# Patient Record
Sex: FEMALE | Race: WHITE | NOT HISPANIC OR LATINO | URBAN - METROPOLITAN AREA
[De-identification: names, ages, dates, MRNs, and addresses within clinical notes are randomized per-mention and may not be internally consistent; named-entity substitution may affect disease eponyms.]

---

## 2021-08-16 ENCOUNTER — TRANSCRIBE ORDERS (OUTPATIENT)
Dept: ADMINISTRATIVE | Facility: HOSPITAL | Age: 31
End: 2021-08-16

## 2021-08-16 DIAGNOSIS — R00.2 PALPITATIONS: Primary | ICD-10-CM

## 2021-08-19 ENCOUNTER — HOSPITAL ENCOUNTER (OUTPATIENT)
Dept: RESPIRATORY THERAPY | Facility: HOSPITAL | Age: 31
Discharge: HOME | End: 2021-08-19

## 2021-08-19 ENCOUNTER — APPOINTMENT (OUTPATIENT)
Dept: RESPIRATORY THERAPY | Facility: HOSPITAL | Age: 31
End: 2021-08-19

## 2021-08-19 DIAGNOSIS — R00.2 PALPITATIONS: ICD-10-CM

## 2021-09-19 LAB
Lab: 23
TOAL ENROLLMENT DAYS: 30

## 2021-09-19 PROCEDURE — 93228 REMOTE 30 DAY ECG REV/REPORT: CPT | Performed by: INTERNAL MEDICINE

## 2024-12-19 ENCOUNTER — HOSPITAL ENCOUNTER (EMERGENCY)
Facility: HOSPITAL | Age: 34
Discharge: HOME | End: 2024-12-19
Attending: EMERGENCY MEDICINE
Payer: MEDICAID

## 2024-12-19 ENCOUNTER — APPOINTMENT (OUTPATIENT)
Dept: GENERAL RADIOLOGY | Facility: HOSPITAL | Age: 34
End: 2024-12-19
Payer: MEDICAID

## 2024-12-19 VITALS
BODY MASS INDEX: 42.28 KG/M2 | TEMPERATURE: 98.7 F | DIASTOLIC BLOOD PRESSURE: 62 MMHG | SYSTOLIC BLOOD PRESSURE: 124 MMHG | HEIGHT: 68 IN | WEIGHT: 279 LBS | HEART RATE: 75 BPM | OXYGEN SATURATION: 100 % | RESPIRATION RATE: 16 BRPM

## 2024-12-19 DIAGNOSIS — R42 DIZZINESS: ICD-10-CM

## 2024-12-19 DIAGNOSIS — R07.9 CHEST PAIN, UNSPECIFIED TYPE: Primary | ICD-10-CM

## 2024-12-19 LAB
ALBUMIN SERPL-MCNC: 4 G/DL (ref 3.5–5.2)
ALBUMIN/GLOB SERPL: 1 G/DL
ALP SERPL-CCNC: 66 U/L (ref 39–117)
ALT SERPL W P-5'-P-CCNC: 20 U/L (ref 1–33)
ANION GAP SERPL CALCULATED.3IONS-SCNC: 10.3 MMOL/L (ref 5–15)
AST SERPL-CCNC: 22 U/L (ref 1–32)
BASOPHILS # BLD AUTO: 0.05 10*3/MM3 (ref 0–0.2)
BASOPHILS NFR BLD AUTO: 0.6 % (ref 0–1.5)
BILIRUB SERPL-MCNC: 0.8 MG/DL (ref 0–1.2)
BUN SERPL-MCNC: 8 MG/DL (ref 6–20)
BUN/CREAT SERPL: 9 (ref 7–25)
CALCIUM SPEC-SCNC: 9.9 MG/DL (ref 8.6–10.5)
CHLORIDE SERPL-SCNC: 102 MMOL/L (ref 98–107)
CO2 SERPL-SCNC: 26.7 MMOL/L (ref 22–29)
CREAT SERPL-MCNC: 0.89 MG/DL (ref 0.57–1)
DEPRECATED RDW RBC AUTO: 39.8 FL (ref 37–54)
EGFRCR SERPLBLD CKD-EPI 2021: 86.8 ML/MIN/1.73
EOSINOPHIL # BLD AUTO: 0.05 10*3/MM3 (ref 0–0.4)
EOSINOPHIL NFR BLD AUTO: 0.6 % (ref 0.3–6.2)
ERYTHROCYTE [DISTWIDTH] IN BLOOD BY AUTOMATED COUNT: 12.1 % (ref 12.3–15.4)
GLOBULIN UR ELPH-MCNC: 4 GM/DL
GLUCOSE SERPL-MCNC: 90 MG/DL (ref 65–99)
HCT VFR BLD AUTO: 37.1 % (ref 34–46.6)
HGB BLD-MCNC: 12.1 G/DL (ref 12–15.9)
IMM GRANULOCYTES # BLD AUTO: 0.02 10*3/MM3 (ref 0–0.05)
IMM GRANULOCYTES NFR BLD AUTO: 0.2 % (ref 0–0.5)
LYMPHOCYTES # BLD AUTO: 3.21 10*3/MM3 (ref 0.7–3.1)
LYMPHOCYTES NFR BLD AUTO: 38.2 % (ref 19.6–45.3)
MCH RBC QN AUTO: 29.2 PG (ref 26.6–33)
MCHC RBC AUTO-ENTMCNC: 32.6 G/DL (ref 31.5–35.7)
MCV RBC AUTO: 89.6 FL (ref 79–97)
MONOCYTES # BLD AUTO: 0.58 10*3/MM3 (ref 0.1–0.9)
MONOCYTES NFR BLD AUTO: 6.9 % (ref 5–12)
NEUTROPHILS NFR BLD AUTO: 4.49 10*3/MM3 (ref 1.7–7)
NEUTROPHILS NFR BLD AUTO: 53.5 % (ref 42.7–76)
NRBC BLD AUTO-RTO: 0 /100 WBC (ref 0–0.2)
PLATELET # BLD AUTO: 382 10*3/MM3 (ref 140–450)
PMV BLD AUTO: 9.3 FL (ref 6–12)
POTASSIUM SERPL-SCNC: 3.8 MMOL/L (ref 3.5–5.2)
PROT SERPL-MCNC: 8 G/DL (ref 6–8.5)
RBC # BLD AUTO: 4.14 10*6/MM3 (ref 3.77–5.28)
SODIUM SERPL-SCNC: 139 MMOL/L (ref 136–145)
TROPONIN T SERPL HS-MCNC: <6 NG/L
WBC NRBC COR # BLD AUTO: 8.4 10*3/MM3 (ref 3.4–10.8)

## 2024-12-19 PROCEDURE — 85025 COMPLETE CBC W/AUTO DIFF WBC: CPT

## 2024-12-19 PROCEDURE — 71046 X-RAY EXAM CHEST 2 VIEWS: CPT

## 2024-12-19 PROCEDURE — 93005 ELECTROCARDIOGRAM TRACING: CPT | Performed by: EMERGENCY MEDICINE

## 2024-12-19 PROCEDURE — 99284 EMERGENCY DEPT VISIT MOD MDM: CPT

## 2024-12-19 PROCEDURE — 84484 ASSAY OF TROPONIN QUANT: CPT

## 2024-12-19 PROCEDURE — 80053 COMPREHEN METABOLIC PANEL: CPT

## 2024-12-19 PROCEDURE — 93005 ELECTROCARDIOGRAM TRACING: CPT

## 2024-12-19 RX ORDER — ALPRAZOLAM 0.5 MG
0.25 TABLET ORAL ONCE
Status: COMPLETED | OUTPATIENT
Start: 2024-12-19 | End: 2024-12-19

## 2024-12-19 RX ORDER — PANTOPRAZOLE SODIUM 40 MG/1
40 TABLET, DELAYED RELEASE ORAL DAILY
Qty: 14 TABLET | Refills: 0 | Status: SHIPPED | OUTPATIENT
Start: 2024-12-19

## 2024-12-19 RX ADMIN — ALPRAZOLAM 0.25 MG: 0.5 TABLET ORAL at 18:53

## 2024-12-19 NOTE — ED PROVIDER NOTES
Subjective   Provider in Triage Note  Patient is a 34-year-old female presenting to the ED for chest pain intermittently the past 3 days.  Patient states her chest pain came on while she was making dinner 3 days ago and has been intermittent ever since.  She reports coming off Trintellix right before this started.  She reports increased belching and states she feels like she cannot get a full deep breath.  She denies any recent travel, OCP use, pain in her calfs.    Due to significant overcrowding in the emergency department patient was initially seen and evaluated in triage.  Provider in triage recommended patient placement in the treatment area to initiate therapy and movement to an ER bed as soon as possible.        History of Present Illness  I interviewed the patient HPI and agree with the nurse practitioner providing triage note as noted above  Review of Systems    No past medical history on file.    Allergies   Allergen Reactions    Amoxicillin GI Bleeding    Penicillins Rash and GI Bleeding       No past surgical history on file.    No family history on file.    Social History     Socioeconomic History    Marital status:            Objective   Physical Exam  Neck has no adenopathy JVD or bruits.  Lungs are clear.  Heart has regular rhythm without murmur rub or.  Chest is nontender.  Ab soft.  Extremities I am unremarkable.  Procedures       EKG interpretation is normal sinus rhythm at a rate of 70 with no acute ST change    ED Course      Results for orders placed or performed during the hospital encounter of 12/19/24   ECG 12 Lead Chest Pain    Collection Time: 12/19/24  3:36 PM   Result Value Ref Range    QT Interval 393 ms    QTC Interval 424 ms   Comprehensive Metabolic Panel    Collection Time: 12/19/24  4:43 PM    Specimen: Blood   Result Value Ref Range    Glucose 90 65 - 99 mg/dL    BUN 8 6 - 20 mg/dL    Creatinine 0.89 0.57 - 1.00 mg/dL    Sodium 139 136 - 145 mmol/L    Potassium 3.8 3.5 -  5.2 mmol/L    Chloride 102 98 - 107 mmol/L    CO2 26.7 22.0 - 29.0 mmol/L    Calcium 9.9 8.6 - 10.5 mg/dL    Total Protein 8.0 6.0 - 8.5 g/dL    Albumin 4.0 3.5 - 5.2 g/dL    ALT (SGPT) 20 1 - 33 U/L    AST (SGOT) 22 1 - 32 U/L    Alkaline Phosphatase 66 39 - 117 U/L    Total Bilirubin 0.8 0.0 - 1.2 mg/dL    Globulin 4.0 gm/dL    A/G Ratio 1.0 g/dL    BUN/Creatinine Ratio 9.0 7.0 - 25.0    Anion Gap 10.3 5.0 - 15.0 mmol/L    eGFR 86.8 >60.0 mL/min/1.73   High Sensitivity Troponin T    Collection Time: 12/19/24  4:43 PM    Specimen: Blood   Result Value Ref Range    HS Troponin T <6 <14 ng/L   CBC Auto Differential    Collection Time: 12/19/24  4:43 PM    Specimen: Blood   Result Value Ref Range    WBC 8.40 3.40 - 10.80 10*3/mm3    RBC 4.14 3.77 - 5.28 10*6/mm3    Hemoglobin 12.1 12.0 - 15.9 g/dL    Hematocrit 37.1 34.0 - 46.6 %    MCV 89.6 79.0 - 97.0 fL    MCH 29.2 26.6 - 33.0 pg    MCHC 32.6 31.5 - 35.7 g/dL    RDW 12.1 (L) 12.3 - 15.4 %    RDW-SD 39.8 37.0 - 54.0 fl    MPV 9.3 6.0 - 12.0 fL    Platelets 382 140 - 450 10*3/mm3    Neutrophil % 53.5 42.7 - 76.0 %    Lymphocyte % 38.2 19.6 - 45.3 %    Monocyte % 6.9 5.0 - 12.0 %    Eosinophil % 0.6 0.3 - 6.2 %    Basophil % 0.6 0.0 - 1.5 %    Immature Grans % 0.2 0.0 - 0.5 %    Neutrophils, Absolute 4.49 1.70 - 7.00 10*3/mm3    Lymphocytes, Absolute 3.21 (H) 0.70 - 3.10 10*3/mm3    Monocytes, Absolute 0.58 0.10 - 0.90 10*3/mm3    Eosinophils, Absolute 0.05 0.00 - 0.40 10*3/mm3    Basophils, Absolute 0.05 0.00 - 0.20 10*3/mm3    Immature Grans, Absolute 0.02 0.00 - 0.05 10*3/mm3    nRBC 0.0 0.0 - 0.2 /100 WBC     XR Chest 2 View    Result Date: 12/19/2024  Impression: 1.No acute radiographic abnormality is identified. Electronically Signed: Jake Daly MD  12/19/2024 4:12 PM EST  Workstation ID: [id number]               HEART Score: 0   Shared Decision Making  I discussed the findings with the patient/patient representative who is in agreement with the treatment  plan and the final disposition.  Risks and benefits of discharge and/or observation/admission were discussed: Yes                                      Medical Decision Making  Chest x-ray interpretation shows no cardiomegaly fusion or infiltrate.  CBC shows no leukocytosis no left shift and no anemia.  Patient has no evidence of current acute coronary syndrome based on EKG and troponin.  Metabolic panel shows no renal insufficiency or electrolyte abnormality.  FTs normal with no evidence of hepatitis.  Patient is pain-free on reexam.  She will be discharged.  She will follow with MD for recheck.  Will place on Protonix for frequent belching as well.    Amount and/or Complexity of Data Reviewed  Labs: ordered. Decision-making details documented in ED Course.  Radiology: ordered and independent interpretation performed.  ECG/medicine tests: ordered and independent interpretation performed.    Risk  Prescription drug management.        Final diagnoses:   Chest pain, unspecified type   Dizziness       ED Disposition  ED Disposition       ED Disposition   Discharge    Condition   Stable    Comment   --               No follow-up provider specified.       Medication List        New Prescriptions      pantoprazole 40 MG EC tablet  Commonly known as: PROTONIX  Take 1 tablet by mouth Daily.               Where to Get Your Medications        Information about where to get these medications is not yet available    Ask your nurse or doctor about these medications  pantoprazole 40 MG EC tablet            Jeison Ramsay MD  12/19/24 7638

## 2024-12-20 LAB
QT INTERVAL: 393 MS
QTC INTERVAL: 424 MS

## 2024-12-20 NOTE — ED NOTES
"Attempted to d/c pt and she stated that she felt like she was going to \"pass out.\" Pt's palms felt warm and clammy. Pt stated that she was recently prescribed anxiety medication through PCP, had night time terror reactions and was taken off. Pt was then placed on a 15 day trial rx of xanax. Pt called to request refill 2 days ago, but pharmacy has yet to fill. Provider notified.  "

## 2025-01-08 ENCOUNTER — TRANSCRIBE ORDERS (OUTPATIENT)
Dept: ADMINISTRATIVE | Facility: HOSPITAL | Age: 35
End: 2025-01-08
Payer: MEDICAID

## 2025-01-08 DIAGNOSIS — R10.11 RUQ PAIN: Primary | ICD-10-CM

## 2025-01-08 DIAGNOSIS — R10.13 EPIGASTRIC PAIN: ICD-10-CM

## 2025-01-08 DIAGNOSIS — R14.0 POSTPRANDIAL BLOATING: ICD-10-CM

## 2025-01-21 ENCOUNTER — HOSPITAL ENCOUNTER (OUTPATIENT)
Dept: ULTRASOUND IMAGING | Facility: HOSPITAL | Age: 35
Discharge: HOME | End: 2025-01-21
Payer: COMMERCIAL

## 2025-01-21 ENCOUNTER — HOSPITAL ENCOUNTER (OUTPATIENT)
Dept: GENERAL RADIOLOGY | Facility: HOSPITAL | Age: 35
Discharge: HOME | End: 2025-01-21
Payer: COMMERCIAL

## 2025-01-21 DIAGNOSIS — R14.0 POSTPRANDIAL BLOATING: ICD-10-CM

## 2025-01-21 DIAGNOSIS — R10.13 EPIGASTRIC PAIN: ICD-10-CM

## 2025-01-21 DIAGNOSIS — R10.11 RUQ PAIN: ICD-10-CM

## 2025-01-21 PROCEDURE — 74230 X-RAY XM SWLNG FUNCJ C+: CPT

## 2025-01-21 PROCEDURE — 92611 MOTION FLUOROSCOPY/SWALLOW: CPT

## 2025-01-21 PROCEDURE — 76705 ECHO EXAM OF ABDOMEN: CPT

## 2025-01-21 RX ADMIN — BARIUM SULFATE 50 ML: 400 SUSPENSION ORAL at 08:51

## 2025-01-21 NOTE — MBS/VFSS/FEES
Acute Care - Speech Language Pathology   Swallow Initial Evaluation  Luis     Patient Name: Digna West  : 1990  MRN: [id number]  Today's Date: 2025               Admit Date: 2025    Visit Dx:     ICD-10-CM ICD-9-CM   1. RUQ pain  R10.11 789.01   2. Epigastric pain  R10.13 789.06   3. Postprandial bloating  R14.0 787.3     There is no problem list on file for this patient.    No past medical history on file.  No past surgical history on file.    SLP Recommendation and Plan  SLP Swallowing Diagnosis: functional oral phase, functional pharyngeal phase (25)  SLP Diet Recommendation: regular textures, thin liquids (25)     SLP Rec. for Method of Medication Administration: as tolerated (25)           Swallow Criteria for Skilled Therapeutic Interventions Met: no problems identified which require skilled intervention (25)  Anticipated Discharge Disposition (SLP): No further SLP services warranted (25)     Therapy Frequency (Swallow): evaluation only (25)     Oral Care Recommendations: Oral Care BID/PRN, Toothbrush (25)    Subjective: Pt consulted for VFSS due to reports of new difficulty swallowing and belching. Pt reports she was placed on soft diet by doctor, but baseline is reg/thin with no swallowing difficulty.   Previous Diet: Reg/thin, eating Soft/thins.   Oral Motor Examination: Unremarkable. Natural, adequate bottom teeth - missing and poor upper teeth.   Patient Positioning: Standing  Viewing Plane: Lateral   Contrast: Barium used across trial textures; administered via spoon, cup, and straw as tolerated. The patient was assessed with the following consistencies: Thins by cup x2, thins by straw x1, puree (applesauce) x2, Peaches, Regular (cracker with paste) x1. Pt able to hold cup and take drinks independently.     Overall Impressions: VFSS completed on this date to address pt reported concerns. Pt  oral-pharyngeal swallow found WNL. It is recommended pt continue with Regular/Thin diet. Pt may benefit from GI follow up to address belching. Results, recommendations, and rationale given to pt - verbalized understanding.     Oral Phase found WNL - lip closure, tongue control, mastication, bolus transport, and oral clearance were all WNL.     Pharyngeal phase found WNL - initiation of swallow, soft palate and laryngeal elevation, hyoid and epiglottic movement, laryngeal vestibular closure, pharyngeal stripping wave, pharyngeal contraction, PES clearance, pharyngeal clearance, and tongue base retraction all WNL. Esophageal scan unremarkable. Pt scored a 1 on the Penetration Aspiration Scale (PAS), details below.     Penetration Aspiration Scale (PAS) is an eight-point scale used to assess the severity of penetration or aspiration observed during a VFSS/MBSS. The scores are as follows:   No Penetration or Aspiration: Material does not enter the airway.  Penetration, Contrast Remains Above Vocal Folds, Subsequently Ejected: Material enters the larynx but stays above the vocal folds and is later ejected.   Penetration, Contrast Remains Above Vocal folds, Not Ejected: Material enters the larynx but remains above the vocal folds without being ejected.   Penetration, Contrast Contacts Vocal Folds, Subsequently Ejected: Material contacts the vocal folds and is later ejected.  Penetration, Contrast Contacts Vocal Folds, Not Ejected: Material contacts the vocal folds but is not ejected.  Aspiration (Contrast Below Vocal Folds), Subsequently Ejected (At Least into Larynx): Material passes below the vocal folds and is later ejected.  Aspiration (Contrast Below Vocal Folds), Not Ejected Despite Effort: Material passes below the vocal folds but is not ejected despite effort.  Aspiration (Contrast Below Vocal Folds), No Effort Made to Eject: Material passes below the vocal folds, and no effort is made to eject it.       SLP  Recommendation and Plan  -Regular/thin  -Possible GI follow up to address belching.   SWALLOW EVALUATION (Last 72 Hours)       SLP Adult Swallow Evaluation       Row Name 01/21/25 0900       Rehab Evaluation    Document Type discharge evaluation/summary  -AA    Subjective Information no complaints  -AA    Patient Observations alert;cooperative;agree to therapy  -AA    Patient Effort excellent  -AA    Symptoms Noted During/After Treatment none  -AA       General Information    Patient Profile Reviewed yes  -AA    Pertinent History Of Current Problem Pt consulted d/t reports of chest pain that developed into swallowing difficulty. Pt reports being placed on soft diet per doctor. Pt also reports frequent belching. No hx of swallowing difficulties before this.  -AA    Current Method of Nutrition soft to chew textures;mechanical ground textures;thin liquids  -AA    Precautions/Limitations, Vision WFL;for purposes of eval  -AA    Precautions/Limitations, Hearing WFL;for purposes of eval  -AA    Prior Level of Function-Communication WFL  -AA    Prior Level of Function-Swallowing no diet consistency restrictions;safe, efficient swallowing in all situations;regular textures;thin liquids  -AA    Plans/Goals Discussed with patient;agreed upon  -AA    Barriers to Rehab none identified  -AA       Oral Motor Structure and Function    Dentition Assessment missing teeth;teeth are in poor condition  -AA    Secretion Management WNL/WFL  -AA    Mucosal Quality moist, healthy  -AA    Volitional Swallow WFL  -AA    Volitional Cough WFL  -AA       Oral Musculature and Cranial Nerve Assessment    Oral Motor General Assessment WFL  -AA       General Eating/Swallowing Observations    Respiratory Support Currently in Use room air  -AA       MBS/VFSS    Utensils Used spoon;cup;straw  -AA    Consistencies Trialed thin liquids;pureed;mixed consistency;mechanical ground textures;regular textures  -AA       MBS/VFSS Interpretation    Oral Prep  Phase WFL  -AA    Oral Transit Phase WFL  -AA    Oral Residue WFL  -AA       Initiation of Pharyngeal Swallow    Initiation of Pharyngeal Swallow WFL  -AA    Pharyngeal Phase functional pharyngeal phase of swallowing  -AA       SLP Evaluation Clinical Impression    SLP Swallowing Diagnosis functional oral phase;functional pharyngeal phase  -AA    Functional Impact no impact on function  -AA    Swallow Criteria for Skilled Therapeutic Interventions Met no problems identified which require skilled intervention  -AA       Recommendations    Therapy Frequency (Swallow) evaluation only  -AA    SLP Diet Recommendation regular textures;thin liquids  -AA    Oral Care Recommendations Oral Care BID/PRN;Toothbrush  -AA    SLP Rec. for Method of Medication Administration as tolerated  -AA    Anticipated Discharge Disposition (SLP) No further SLP services warranted  -AA              User Key  (r) = Recorded By, (t) = Taken By, (c) = Cosigned By      Initials Name Effective Dates    AA Lolita Isaac SLP 06/18/24 -                     EDUCATION  The patient has been educated in the following areas:   Dysphagia (Swallowing Impairment).         LIZZ Diaz  1/21/2025

## 2025-02-07 ENCOUNTER — APPOINTMENT (OUTPATIENT)
Dept: GENERAL RADIOLOGY | Facility: HOSPITAL | Age: 35
End: 2025-02-07
Payer: COMMERCIAL

## 2025-02-07 ENCOUNTER — HOSPITAL ENCOUNTER (OUTPATIENT)
Facility: HOSPITAL | Age: 35
Setting detail: OBSERVATION
Discharge: HOME | End: 2025-02-09
Attending: FAMILY MEDICINE | Admitting: FAMILY MEDICINE
Payer: COMMERCIAL

## 2025-02-07 ENCOUNTER — APPOINTMENT (OUTPATIENT)
Dept: CARDIOLOGY | Facility: HOSPITAL | Age: 35
End: 2025-02-07
Payer: COMMERCIAL

## 2025-02-07 PROBLEM — R07.9 CHEST PAIN: Status: ACTIVE | Noted: 2025-02-07

## 2025-02-07 LAB
ALBUMIN SERPL-MCNC: 4.4 G/DL (ref 3.5–5.2)
ALBUMIN SERPL-MCNC: 5 G/DL (ref 3.5–5.2)
ALBUMIN/GLOB SERPL: 1.3 G/DL
ALBUMIN/GLOB SERPL: 1.4 G/DL
ALP SERPL-CCNC: 71 U/L (ref 39–117)
ALP SERPL-CCNC: 77 U/L (ref 39–117)
ALT SERPL W P-5'-P-CCNC: 21 U/L (ref 1–33)
ALT SERPL W P-5'-P-CCNC: 24 U/L (ref 1–33)
AMYLASE SERPL-CCNC: 38 U/L (ref 28–100)
ANION GAP SERPL CALCULATED.3IONS-SCNC: 11.2 MMOL/L (ref 5–15)
ANION GAP SERPL CALCULATED.3IONS-SCNC: 9.3 MMOL/L (ref 5–15)
AST SERPL-CCNC: 21 U/L (ref 1–32)
AST SERPL-CCNC: 28 U/L (ref 1–32)
B PARAPERT DNA SPEC QL NAA+PROBE: NOT DETECTED
B PERT DNA SPEC QL NAA+PROBE: NOT DETECTED
BACTERIA UR QL AUTO: NORMAL /HPF
BASOPHILS # BLD AUTO: 0.06 10*3/MM3 (ref 0–0.2)
BASOPHILS # BLD AUTO: 0.06 10*3/MM3 (ref 0–0.2)
BASOPHILS NFR BLD AUTO: 0.6 % (ref 0–1.5)
BASOPHILS NFR BLD AUTO: 0.7 % (ref 0–1.5)
BILIRUB SERPL-MCNC: 0.7 MG/DL (ref 0–1.2)
BILIRUB SERPL-MCNC: 0.7 MG/DL (ref 0–1.2)
BILIRUB UR QL STRIP: NEGATIVE
BUN SERPL-MCNC: 10 MG/DL (ref 6–20)
BUN SERPL-MCNC: 8 MG/DL (ref 6–20)
BUN/CREAT SERPL: 10 (ref 7–25)
BUN/CREAT SERPL: 8.8 (ref 7–25)
C PNEUM DNA NPH QL NAA+NON-PROBE: NOT DETECTED
CALCIUM SPEC-SCNC: 9.8 MG/DL (ref 8.6–10.5)
CALCIUM SPEC-SCNC: 9.9 MG/DL (ref 8.6–10.5)
CHLORIDE SERPL-SCNC: 105 MMOL/L (ref 98–107)
CHLORIDE SERPL-SCNC: 105 MMOL/L (ref 98–107)
CK MB SERPL-CCNC: <1 NG/ML
CK SERPL-CCNC: 80 U/L (ref 20–180)
CLARITY UR: CLEAR
CO2 SERPL-SCNC: 25.8 MMOL/L (ref 22–29)
CO2 SERPL-SCNC: 26.7 MMOL/L (ref 22–29)
COLOR UR: YELLOW
CREAT SERPL-MCNC: 0.91 MG/DL (ref 0.57–1)
CREAT SERPL-MCNC: 1 MG/DL (ref 0.57–1)
CRP SERPL-MCNC: 0.94 MG/DL (ref 0–0.5)
DEPRECATED RDW RBC AUTO: 39.8 FL (ref 37–54)
DEPRECATED RDW RBC AUTO: 39.9 FL (ref 37–54)
EGFRCR SERPLBLD CKD-EPI 2021: 75.5 ML/MIN/1.73
EGFRCR SERPLBLD CKD-EPI 2021: 84.5 ML/MIN/1.73
EOSINOPHIL # BLD AUTO: 0.05 10*3/MM3 (ref 0–0.4)
EOSINOPHIL # BLD AUTO: 0.13 10*3/MM3 (ref 0–0.4)
EOSINOPHIL NFR BLD AUTO: 0.6 % (ref 0.3–6.2)
EOSINOPHIL NFR BLD AUTO: 1.4 % (ref 0.3–6.2)
ERYTHROCYTE [DISTWIDTH] IN BLOOD BY AUTOMATED COUNT: 12 % (ref 12.3–15.4)
ERYTHROCYTE [DISTWIDTH] IN BLOOD BY AUTOMATED COUNT: 12.2 % (ref 12.3–15.4)
FLUAV SUBTYP SPEC NAA+PROBE: NOT DETECTED
FLUBV RNA ISLT QL NAA+PROBE: NOT DETECTED
GEN 5 1HR TROPONIN T REFLEX: <6 NG/L
GLOBULIN UR ELPH-MCNC: 3.3 GM/DL
GLOBULIN UR ELPH-MCNC: 3.5 GM/DL
GLUCOSE SERPL-MCNC: 78 MG/DL (ref 65–99)
GLUCOSE SERPL-MCNC: 90 MG/DL (ref 65–99)
GLUCOSE UR STRIP-MCNC: NEGATIVE MG/DL
HADV DNA SPEC NAA+PROBE: NOT DETECTED
HCOV 229E RNA SPEC QL NAA+PROBE: NOT DETECTED
HCOV HKU1 RNA SPEC QL NAA+PROBE: NOT DETECTED
HCOV NL63 RNA SPEC QL NAA+PROBE: NOT DETECTED
HCOV OC43 RNA SPEC QL NAA+PROBE: NOT DETECTED
HCT VFR BLD AUTO: 38.6 % (ref 34–46.6)
HCT VFR BLD AUTO: 40 % (ref 34–46.6)
HGB BLD-MCNC: 12.3 G/DL (ref 12–15.9)
HGB BLD-MCNC: 12.9 G/DL (ref 12–15.9)
HGB UR QL STRIP.AUTO: ABNORMAL
HMPV RNA NPH QL NAA+NON-PROBE: NOT DETECTED
HPIV1 RNA ISLT QL NAA+PROBE: NOT DETECTED
HPIV2 RNA SPEC QL NAA+PROBE: NOT DETECTED
HPIV3 RNA NPH QL NAA+PROBE: NOT DETECTED
HPIV4 P GENE NPH QL NAA+PROBE: NOT DETECTED
HYALINE CASTS UR QL AUTO: NORMAL /LPF
IMM GRANULOCYTES # BLD AUTO: 0.02 10*3/MM3 (ref 0–0.05)
IMM GRANULOCYTES # BLD AUTO: 0.03 10*3/MM3 (ref 0–0.05)
IMM GRANULOCYTES NFR BLD AUTO: 0.2 % (ref 0–0.5)
IMM GRANULOCYTES NFR BLD AUTO: 0.4 % (ref 0–0.5)
KETONES UR QL STRIP: NEGATIVE
LEUKOCYTE ESTERASE UR QL STRIP.AUTO: NEGATIVE
LIPASE SERPL-CCNC: 11 U/L (ref 13–60)
LYMPHOCYTES # BLD AUTO: 2.63 10*3/MM3 (ref 0.7–3.1)
LYMPHOCYTES # BLD AUTO: 3.62 10*3/MM3 (ref 0.7–3.1)
LYMPHOCYTES NFR BLD AUTO: 32.8 % (ref 19.6–45.3)
LYMPHOCYTES NFR BLD AUTO: 39 % (ref 19.6–45.3)
M PNEUMO IGG SER IA-ACNC: NOT DETECTED
MCH RBC QN AUTO: 28.7 PG (ref 26.6–33)
MCH RBC QN AUTO: 28.9 PG (ref 26.6–33)
MCHC RBC AUTO-ENTMCNC: 31.9 G/DL (ref 31.5–35.7)
MCHC RBC AUTO-ENTMCNC: 32.3 G/DL (ref 31.5–35.7)
MCV RBC AUTO: 89.5 FL (ref 79–97)
MCV RBC AUTO: 90.2 FL (ref 79–97)
MONOCYTES # BLD AUTO: 0.54 10*3/MM3 (ref 0.1–0.9)
MONOCYTES # BLD AUTO: 0.78 10*3/MM3 (ref 0.1–0.9)
MONOCYTES NFR BLD AUTO: 6.7 % (ref 5–12)
MONOCYTES NFR BLD AUTO: 8.4 % (ref 5–12)
MYOGLOBIN SERPL-MCNC: 38.2 NG/ML (ref 25–58)
NEUTROPHILS NFR BLD AUTO: 4.67 10*3/MM3 (ref 1.7–7)
NEUTROPHILS NFR BLD AUTO: 4.71 10*3/MM3 (ref 1.7–7)
NEUTROPHILS NFR BLD AUTO: 50.4 % (ref 42.7–76)
NEUTROPHILS NFR BLD AUTO: 58.8 % (ref 42.7–76)
NITRITE UR QL STRIP: NEGATIVE
NRBC BLD AUTO-RTO: 0 /100 WBC (ref 0–0.2)
NRBC BLD AUTO-RTO: 0 /100 WBC (ref 0–0.2)
PH UR STRIP.AUTO: 6.5 [PH] (ref 5–8)
PLATELET # BLD AUTO: 338 10*3/MM3 (ref 140–450)
PLATELET # BLD AUTO: 354 10*3/MM3 (ref 140–450)
PMV BLD AUTO: 9.4 FL (ref 6–12)
PMV BLD AUTO: 9.6 FL (ref 6–12)
POTASSIUM SERPL-SCNC: 3.8 MMOL/L (ref 3.5–5.2)
POTASSIUM SERPL-SCNC: 4.1 MMOL/L (ref 3.5–5.2)
PROT SERPL-MCNC: 7.7 G/DL (ref 6–8.5)
PROT SERPL-MCNC: 8.5 G/DL (ref 6–8.5)
PROT UR QL STRIP: NEGATIVE
RBC # BLD AUTO: 4.28 10*6/MM3 (ref 3.77–5.28)
RBC # BLD AUTO: 4.47 10*6/MM3 (ref 3.77–5.28)
RBC # UR STRIP: NORMAL /HPF
REF LAB TEST METHOD: NORMAL
RHINOVIRUS RNA SPEC NAA+PROBE: NOT DETECTED
RSV RNA NPH QL NAA+NON-PROBE: NOT DETECTED
SARS-COV-2 RNA RESP QL NAA+PROBE: NOT DETECTED
SODIUM SERPL-SCNC: 141 MMOL/L (ref 136–145)
SODIUM SERPL-SCNC: 142 MMOL/L (ref 136–145)
SP GR UR STRIP: 1.01 (ref 1–1.03)
SQUAMOUS #/AREA URNS HPF: NORMAL /HPF
TROPONIN T NUMERIC DELTA: NORMAL
TROPONIN T SERPL HS-MCNC: <6 NG/L
UROBILINOGEN UR QL STRIP: ABNORMAL
WBC # UR STRIP: NORMAL /HPF
WBC NRBC COR # BLD AUTO: 8.02 10*3/MM3 (ref 3.4–10.8)
WBC NRBC COR # BLD AUTO: 9.28 10*3/MM3 (ref 3.4–10.8)

## 2025-02-07 PROCEDURE — 83690 ASSAY OF LIPASE: CPT | Performed by: FAMILY MEDICINE

## 2025-02-07 PROCEDURE — 81001 URINALYSIS AUTO W/SCOPE: CPT | Performed by: FAMILY MEDICINE

## 2025-02-07 PROCEDURE — 84260 ASSAY OF SEROTONIN: CPT | Performed by: FAMILY MEDICINE

## 2025-02-07 PROCEDURE — 0202U NFCT DS 22 TRGT SARS-COV-2: CPT | Performed by: NURSE PRACTITIONER

## 2025-02-07 PROCEDURE — 96372 THER/PROPH/DIAG INJ SC/IM: CPT

## 2025-02-07 PROCEDURE — 82550 ASSAY OF CK (CPK): CPT | Performed by: FAMILY MEDICINE

## 2025-02-07 PROCEDURE — G0378 HOSPITAL OBSERVATION PER HR: HCPCS

## 2025-02-07 PROCEDURE — 80053 COMPREHEN METABOLIC PANEL: CPT | Performed by: FAMILY MEDICINE

## 2025-02-07 PROCEDURE — 86140 C-REACTIVE PROTEIN: CPT | Performed by: FAMILY MEDICINE

## 2025-02-07 PROCEDURE — 82553 CREATINE MB FRACTION: CPT | Performed by: FAMILY MEDICINE

## 2025-02-07 PROCEDURE — 71046 X-RAY EXAM CHEST 2 VIEWS: CPT

## 2025-02-07 PROCEDURE — 83874 ASSAY OF MYOGLOBIN: CPT | Performed by: FAMILY MEDICINE

## 2025-02-07 PROCEDURE — 93306 TTE W/DOPPLER COMPLETE: CPT

## 2025-02-07 PROCEDURE — 85025 COMPLETE CBC W/AUTO DIFF WBC: CPT | Performed by: FAMILY MEDICINE

## 2025-02-07 PROCEDURE — 93005 ELECTROCARDIOGRAM TRACING: CPT | Performed by: FAMILY MEDICINE

## 2025-02-07 PROCEDURE — 25010000002 ENOXAPARIN PER 10 MG: Performed by: FAMILY MEDICINE

## 2025-02-07 PROCEDURE — 82150 ASSAY OF AMYLASE: CPT | Performed by: FAMILY MEDICINE

## 2025-02-07 PROCEDURE — G0379 DIRECT REFER HOSPITAL OBSERV: HCPCS

## 2025-02-07 PROCEDURE — 84484 ASSAY OF TROPONIN QUANT: CPT | Performed by: FAMILY MEDICINE

## 2025-02-07 PROCEDURE — 93306 TTE W/DOPPLER COMPLETE: CPT | Performed by: INTERNAL MEDICINE

## 2025-02-07 RX ORDER — IBUPROFEN 400 MG/1
800 TABLET, FILM COATED ORAL
Status: DISCONTINUED | OUTPATIENT
Start: 2025-02-07 | End: 2025-02-08

## 2025-02-07 RX ORDER — ENOXAPARIN SODIUM 100 MG/ML
40 INJECTION SUBCUTANEOUS 2 TIMES DAILY
Status: DISCONTINUED | OUTPATIENT
Start: 2025-02-07 | End: 2025-02-09 | Stop reason: HOSPADM

## 2025-02-07 RX ORDER — DICLOFENAC SODIUM 100 MG/1
1 TABLET, EXTENDED RELEASE ORAL EVERY 12 HOURS SCHEDULED
COMMUNITY
Start: 2024-11-25 | End: 2025-02-09 | Stop reason: HOSPADM

## 2025-02-07 RX ORDER — BISACODYL 5 MG/1
5 TABLET, DELAYED RELEASE ORAL DAILY PRN
Status: DISCONTINUED | OUTPATIENT
Start: 2025-02-07 | End: 2025-02-09 | Stop reason: HOSPADM

## 2025-02-07 RX ORDER — POLYETHYLENE GLYCOL 3350 17 G/17G
17 POWDER, FOR SOLUTION ORAL DAILY PRN
Status: DISCONTINUED | OUTPATIENT
Start: 2025-02-07 | End: 2025-02-09 | Stop reason: HOSPADM

## 2025-02-07 RX ORDER — SUCRALFATE 1 G/1
1 TABLET ORAL EVERY 12 HOURS SCHEDULED
COMMUNITY
Start: 2025-01-02

## 2025-02-07 RX ORDER — NITROGLYCERIN 0.4 MG/1
0.4 TABLET SUBLINGUAL
COMMUNITY
Start: 2025-01-15 | End: 2025-02-09 | Stop reason: HOSPADM

## 2025-02-07 RX ORDER — NITROGLYCERIN 0.4 MG/1
0.4 TABLET SUBLINGUAL
Status: DISCONTINUED | OUTPATIENT
Start: 2025-02-07 | End: 2025-02-09 | Stop reason: HOSPADM

## 2025-02-07 RX ORDER — PANTOPRAZOLE SODIUM 40 MG/1
40 TABLET, DELAYED RELEASE ORAL DAILY
Status: DISCONTINUED | OUTPATIENT
Start: 2025-02-07 | End: 2025-02-09 | Stop reason: HOSPADM

## 2025-02-07 RX ORDER — FAMOTIDINE 40 MG/1
1 TABLET, FILM COATED ORAL DAILY
COMMUNITY
Start: 2025-01-08

## 2025-02-07 RX ORDER — ALUMINA, MAGNESIA, AND SIMETHICONE 2400; 2400; 240 MG/30ML; MG/30ML; MG/30ML
15 SUSPENSION ORAL EVERY 6 HOURS PRN
Status: DISCONTINUED | OUTPATIENT
Start: 2025-02-07 | End: 2025-02-09 | Stop reason: HOSPADM

## 2025-02-07 RX ORDER — ASPIRIN 81 MG/1
81 TABLET, CHEWABLE ORAL DAILY
Status: DISCONTINUED | OUTPATIENT
Start: 2025-02-07 | End: 2025-02-09 | Stop reason: HOSPADM

## 2025-02-07 RX ORDER — BISACODYL 10 MG
10 SUPPOSITORY, RECTAL RECTAL DAILY PRN
Status: DISCONTINUED | OUTPATIENT
Start: 2025-02-07 | End: 2025-02-09 | Stop reason: HOSPADM

## 2025-02-07 RX ORDER — OMEPRAZOLE 40 MG/1
40 CAPSULE, DELAYED RELEASE ORAL DAILY
COMMUNITY
Start: 2025-01-08

## 2025-02-07 RX ORDER — MONTELUKAST SODIUM 10 MG/1
10 TABLET ORAL NIGHTLY
Status: DISCONTINUED | OUTPATIENT
Start: 2025-02-07 | End: 2025-02-09 | Stop reason: HOSPADM

## 2025-02-07 RX ORDER — SODIUM CHLORIDE 0.9 % (FLUSH) 0.9 %
10 SYRINGE (ML) INJECTION AS NEEDED
Status: DISCONTINUED | OUTPATIENT
Start: 2025-02-07 | End: 2025-02-09 | Stop reason: HOSPADM

## 2025-02-07 RX ORDER — ASPIRIN 81 MG/1
81 TABLET, CHEWABLE ORAL DAILY
COMMUNITY
Start: 2025-01-15 | End: 2025-02-09 | Stop reason: HOSPADM

## 2025-02-07 RX ORDER — SODIUM CHLORIDE 9 MG/ML
40 INJECTION, SOLUTION INTRAVENOUS AS NEEDED
Status: DISCONTINUED | OUTPATIENT
Start: 2025-02-07 | End: 2025-02-09 | Stop reason: HOSPADM

## 2025-02-07 RX ORDER — ONDANSETRON 2 MG/ML
4 INJECTION INTRAMUSCULAR; INTRAVENOUS EVERY 6 HOURS PRN
Status: DISCONTINUED | OUTPATIENT
Start: 2025-02-07 | End: 2025-02-09 | Stop reason: HOSPADM

## 2025-02-07 RX ORDER — MONTELUKAST SODIUM 10 MG/1
10 TABLET ORAL NIGHTLY
COMMUNITY
Start: 2025-02-05

## 2025-02-07 RX ORDER — ALPRAZOLAM 0.5 MG
0.5 TABLET ORAL DAILY
COMMUNITY
Start: 2024-12-04

## 2025-02-07 RX ORDER — ALPRAZOLAM 0.5 MG
0.5 TABLET ORAL DAILY
Status: DISCONTINUED | OUTPATIENT
Start: 2025-02-07 | End: 2025-02-09 | Stop reason: HOSPADM

## 2025-02-07 RX ORDER — AMOXICILLIN 250 MG
2 CAPSULE ORAL 2 TIMES DAILY PRN
Status: DISCONTINUED | OUTPATIENT
Start: 2025-02-07 | End: 2025-02-09 | Stop reason: HOSPADM

## 2025-02-07 RX ORDER — SODIUM CHLORIDE 0.9 % (FLUSH) 0.9 %
10 SYRINGE (ML) INJECTION EVERY 12 HOURS SCHEDULED
Status: DISCONTINUED | OUTPATIENT
Start: 2025-02-07 | End: 2025-02-09 | Stop reason: HOSPADM

## 2025-02-07 RX ORDER — METOPROLOL SUCCINATE 25 MG/1
1 TABLET, EXTENDED RELEASE ORAL DAILY
COMMUNITY
Start: 2025-02-04

## 2025-02-07 RX ORDER — SUCRALFATE 1 G/1
1 TABLET ORAL EVERY 12 HOURS SCHEDULED
Status: DISCONTINUED | OUTPATIENT
Start: 2025-02-07 | End: 2025-02-09 | Stop reason: HOSPADM

## 2025-02-07 RX ORDER — METOPROLOL SUCCINATE 25 MG/1
25 TABLET, EXTENDED RELEASE ORAL DAILY
Status: DISCONTINUED | OUTPATIENT
Start: 2025-02-07 | End: 2025-02-09 | Stop reason: HOSPADM

## 2025-02-07 RX ADMIN — PANTOPRAZOLE SODIUM 40 MG: 40 TABLET, DELAYED RELEASE ORAL at 17:15

## 2025-02-07 RX ADMIN — Medication 10 ML: at 21:10

## 2025-02-07 RX ADMIN — ASPIRIN 81 MG CHEWABLE TABLET 81 MG: 81 TABLET CHEWABLE at 17:15

## 2025-02-07 RX ADMIN — MONTELUKAST 10 MG: 10 TABLET, FILM COATED ORAL at 20:53

## 2025-02-07 RX ADMIN — IBUPROFEN 800 MG: 400 TABLET, FILM COATED ORAL at 17:15

## 2025-02-07 RX ADMIN — ENOXAPARIN SODIUM 40 MG: 100 INJECTION SUBCUTANEOUS at 20:53

## 2025-02-07 RX ADMIN — SUCRALFATE 1 G: 1 TABLET ORAL at 20:53

## 2025-02-07 NOTE — PLAN OF CARE
Problem: Adult Inpatient Plan of Care  Goal: Plan of Care Review  Outcome: Progressing  Goal: Patient-Specific Goal (Individualized)  Outcome: Progressing  Goal: Absence of Hospital-Acquired Illness or Injury  Outcome: Progressing  Intervention: Identify and Manage Fall Risk  Recent Flowsheet Documentation  Taken 2/7/2025 1527 by Darshana Moise RN  Safety Promotion/Fall Prevention:   assistive device/personal items within reach   clutter free environment maintained   nonskid shoes/slippers when out of bed   room organization consistent   safety round/check completed  Intervention: Prevent Skin Injury  Recent Flowsheet Documentation  Taken 2/7/2025 1527 by Darshana Moise RN  Body Position: position changed independently  Intervention: Prevent and Manage VTE (Venous Thromboembolism) Risk  Recent Flowsheet Documentation  Taken 2/7/2025 1527 by Darshana Moise RN  VTE Prevention/Management:   bilateral   SCDs (sequential compression devices) off   patient refused intervention  Intervention: Prevent Infection  Recent Flowsheet Documentation  Taken 2/7/2025 1527 by Darshana Moise RN  Infection Prevention: hand hygiene promoted  Goal: Optimal Comfort and Wellbeing  Outcome: Progressing  Intervention: Provide Person-Centered Care  Recent Flowsheet Documentation  Taken 2/7/2025 1527 by Darshana Moise RN  Trust Relationship/Rapport:   care explained   thoughts/feelings acknowledged  Goal: Readiness for Transition of Care  Outcome: Progressing  Intervention: Mutually Develop Transition Plan  Recent Flowsheet Documentation  Taken 2/7/2025 1519 by Darshana Moise RN  Transportation Anticipated: family or friend will provide  Patient/Family Anticipated Services at Transition: none  Patient/Family Anticipates Transition to: home with family  Taken 2/7/2025 1518 by Darshana Moise RN  Equipment Currently Used at Home:   walker, rolling   bp cuff   bath bench   glucometer   pulse ox     Problem:  Comorbidity Management  Goal: Blood Pressure in Desired Range  Outcome: Progressing  Intervention: Maintain Blood Pressure Management  Recent Flowsheet Documentation  Taken 2/7/2025 1527 by Darshana Moise RN  Medication Review/Management: medications reviewed   Goal Outcome Evaluation:

## 2025-02-07 NOTE — H&P
Patient Care Team:  Tati Issa MD as PCP - General (Family Medicine)    Chief complaint  CP, SOB, N/V    Subjective     Patient is a 34 y.o. female presents with CP, SOB, dysphagia, N/V for the past 24 hrs, not getting any better, wt loss    Review of Systems   Constitutional:  Positive for activity change, appetite change, fatigue and unexpected weight change.   Eyes: Negative.    Respiratory:  Positive for cough, chest tightness and shortness of breath.    Cardiovascular:  Positive for chest pain.   Gastrointestinal:  Positive for nausea and vomiting.   Endocrine: Negative.    Genitourinary: Negative.    Musculoskeletal: Negative.    Skin: Negative.    Neurological:  Positive for dizziness, weakness and light-headedness.   Psychiatric/Behavioral:  The patient is nervous/anxious.            History  History reviewed. No pertinent past medical history.  History reviewed. No pertinent surgical history.  History reviewed. No pertinent family history.  Social History     Tobacco Use    Smoking status: Former     Types: Cigarettes     Start date:      Quit date:      Years since quittin.1   Vaping Use    Vaping status: Never Used   Substance Use Topics    Alcohol use: Not Currently    Drug use: Never     Medications Prior to Admission   Medication Sig Dispense Refill Last Dose/Taking    ALPRAZolam (XANAX) 0.5 MG tablet Take 1 tablet by mouth Daily.   Past Week    aspirin 81 MG chewable tablet Chew 1 tablet Daily.   2025 Morning    diclofenac sodium (VOTAREN XR) 100 MG 24 hr tablet Take 1 tablet by mouth Every 12 (Twelve) Hours.   Past Week    famotidine (PEPCID) 40 MG tablet Take 1 tablet by mouth Daily.   2025    metoprolol succinate XL (TOPROL-XL) 25 MG 24 hr tablet Take 1 tablet by mouth Daily.   2025    montelukast (SINGULAIR) 10 MG tablet Take 1 tablet by mouth Every Night.   2025    nitroglycerin (NITROSTAT) 0.4 MG SL tablet Place 1 tablet under the tongue Every 5  (Five) Minutes As Needed for Chest Pain.   Past Month    omeprazole (priLOSEC) 40 MG capsule Take 1 capsule by mouth Daily.   2/6/2025    pantoprazole (PROTONIX) 40 MG EC tablet Take 1 tablet by mouth Daily. 14 tablet 0 2/6/2025    sucralfate (CARAFATE) 1 g tablet Take 1 tablet by mouth Every 12 (Twelve) Hours.   2/7/2025     Allergies:  Amoxicillin and Penicillins    Objective     Vital Signs  Temp:  [98.1 °F (36.7 °C)] 98.1 °F (36.7 °C)  Heart Rate:  [68] 68  Resp:  [15] 15  BP: (132)/(79) 132/79    Physical Exam:       General Appearance:    Alert, cooperative, in no acute distress   Eyes:            Lids and lashes normal, conjunctivae and sclerae normal, no   icterus, no pallor, corneas clear, PERRLA   Ears:    Ears appear intact with no abnormalities noted   Throat:   No oral lesions, no thrush, oral mucosa moist   Neck:   No adenopathy, supple, trachea midline, no thyromegaly, no   carotid bruit, no JVD   Lungs:     Clear to auscultation,respirations regular, even and                Unlabored     Heart:    Regular rhythm and normal rate, normal S1 and S2, no          murmur, no gallop, no rub, no click   Abdomen:     Normal bowel sounds, no masses, no organomegaly, soft      non-tender, non-distended, no guarding, no rebound                tenderness   Extremities:   Moves all extremities well, no edema, no cyanosis, no           redness   Pulses:   Pulses palpable and equal bilaterally   Skin:   No bleeding, bruising or rash   Neurologic:   Cranial nerves 2 - 12 grossly intact, sensation intact, DTR       present and equal bilaterally       Results Review:  Lab Results (last 48 hours)       Procedure Component Value Units Date/Time    Amylase [[id number]]  (Normal) Collected: 02/07/25 1643    Specimen: Blood Updated: 02/07/25 1807     Amylase 38 U/L     Urinalysis, Microscopic Only - Urine, Clean Catch [[id number]] Collected: 02/07/25 1642    Specimen: Urine, Clean Catch Updated: 02/07/25 1709     RBC, UA 0-2  /HPF      WBC, UA 0-2 /HPF      Comment: Urine culture not indicated.        Bacteria, UA None Seen /HPF      Squamous Epithelial Cells, UA 0-2 /HPF      Hyaline Casts, UA None Seen /LPF      Methodology Automated Microscopy    Urinalysis With Culture If Indicated - Urine, Clean Catch [[id number]]  (Abnormal) Collected: 02/07/25 1642    Specimen: Urine, Clean Catch Updated: 02/07/25 1709     Color, UA Yellow     Appearance, UA Clear     pH, UA 6.5     Specific Gravity, UA 1.006     Glucose, UA Negative     Ketones, UA Negative     Bilirubin, UA Negative     Blood, UA Trace     Protein, UA Negative     Leuk Esterase, UA Negative     Nitrite, UA Negative     Urobilinogen, UA 1.0 E.U./dL    Narrative:      In absence of clinical symptoms, the presence of pyuria, bacteria, and/or nitrites on the urinalysis result does not correlate with infection.    CBC & Differential [[id number]]  (Abnormal) Collected: 02/07/25 1643    Specimen: Blood Updated: 02/07/25 1656    Narrative:      The following orders were created for panel order CBC & Differential.  Procedure                               Abnormality         Status                     ---------                               -----------         ------                     CBC Auto Differential[[id number]]        Abnormal            Final result                 Please view results for these tests on the individual orders.    CBC Auto Differential [[id number]]  (Abnormal) Collected: 02/07/25 1643    Specimen: Blood Updated: 02/07/25 1656     WBC 8.02 10*3/mm3      RBC 4.47 10*6/mm3      Hemoglobin 12.9 g/dL      Hematocrit 40.0 %      MCV 89.5 fL      MCH 28.9 pg      MCHC 32.3 g/dL      RDW 12.0 %      RDW-SD 39.8 fl      MPV 9.4 fL      Platelets 354 10*3/mm3      Neutrophil % 58.8 %      Lymphocyte % 32.8 %      Monocyte % 6.7 %      Eosinophil % 0.6 %      Basophil % 0.7 %      Immature Grans % 0.4 %      Neutrophils, Absolute 4.71 10*3/mm3      Lymphocytes, Absolute 2.63  10*3/mm3      Monocytes, Absolute 0.54 10*3/mm3      Eosinophils, Absolute 0.05 10*3/mm3      Basophils, Absolute 0.06 10*3/mm3      Immature Grans, Absolute 0.03 10*3/mm3      nRBC 0.0 /100 WBC     Serotonin Serum [[id number]] Collected: 02/07/25 1643    Specimen: Blood Updated: 02/07/25 1656    CK Total & CKMB [[id number]] Collected: 02/07/25 1643    Specimen: Blood Updated: 02/07/25 1656    Myoglobin, Serum [[id number]] Collected: 02/07/25 1643    Specimen: Blood Updated: 02/07/25 1656    Comprehensive Metabolic Panel [[id number]] Collected: 02/07/25 1643    Specimen: Blood Updated: 02/07/25 1655    High Sensitivity Troponin T [[id number]] Collected: 02/07/25 1643    Specimen: Blood Updated: 02/07/25 1655    C-reactive Protein [[id number]] Collected: 02/07/25 1643    Specimen: Blood Updated: 02/07/25 1655    Lipase [[id number]] Collected: 02/07/25 1643    Specimen: Blood Updated: 02/07/25 1655            Imaging Results (Last 24 Hours)       Procedure Component Value Units Date/Time    XR Chest PA & Lateral [[id number]] Resulted: 02/07/25 1705     Updated: 02/07/25 1706                 Assessment & Plan     Principal Problem:    Chest pain with SOB   -  Cardiac enzymes, EKG, 2D ECHO and cardiology consult     Dysphagia, N/V with wt loss -  GI consult     Generalized anxiety disorder - continue      Major recurrent depression - continue medicine     Tobacco abuse - counseled to quit smoking      Stress ulcer/DVT prophylaxis            Plan for disposition:  Home at discharge     Tati Issa MD  02/07/25  18:25 EST

## 2025-02-08 ENCOUNTER — ON CAMPUS - OUTPATIENT (OUTPATIENT)
Dept: URBAN - METROPOLITAN AREA HOSPITAL 85 | Facility: HOSPITAL | Age: 35
End: 2025-02-08

## 2025-02-08 DIAGNOSIS — R10.13 EPIGASTRIC PAIN: ICD-10-CM

## 2025-02-08 DIAGNOSIS — R11.0 NAUSEA: ICD-10-CM

## 2025-02-08 DIAGNOSIS — K22.4 DYSKINESIA OF ESOPHAGUS: ICD-10-CM

## 2025-02-08 DIAGNOSIS — K21.9 GASTRO-ESOPHAGEAL REFLUX DISEASE WITHOUT ESOPHAGITIS: ICD-10-CM

## 2025-02-08 DIAGNOSIS — K59.00 CONSTIPATION, UNSPECIFIED: ICD-10-CM

## 2025-02-08 DIAGNOSIS — K76.0 FATTY (CHANGE OF) LIVER, NOT ELSEWHERE CLASSIFIED: ICD-10-CM

## 2025-02-08 LAB
AORTIC DIMENSIONLESS INDEX: 0.89 (DI)
AV MEAN PRESS GRAD SYS DOP V1V2: 3 MMHG
AV VMAX SYS DOP: 123 CM/SEC
BH CV ECHO MEAS - AO MAX PG: 6.1 MMHG
BH CV ECHO MEAS - AO V2 VTI: 26.3 CM
BH CV ECHO MEAS - AVA(I,D): 2.8 CM2
BH CV ECHO MEAS - EDV(CUBED): 125 ML
BH CV ECHO MEAS - EDV(MOD-SP4): 104 ML
BH CV ECHO MEAS - EF(MOD-SP4): 65 %
BH CV ECHO MEAS - ESV(CUBED): 27 ML
BH CV ECHO MEAS - ESV(MOD-SP4): 36.4 ML
BH CV ECHO MEAS - FS: 40 %
BH CV ECHO MEAS - IVS/LVPW: 1 CM
BH CV ECHO MEAS - IVSD: 1 CM
BH CV ECHO MEAS - LA DIMENSION: 4 CM
BH CV ECHO MEAS - LV DIASTOLIC VOL/BSA (35-75): 43.9 CM2
BH CV ECHO MEAS - LV MASS(C)D: 182 GRAMS
BH CV ECHO MEAS - LV MAX PG: 4.8 MMHG
BH CV ECHO MEAS - LV MEAN PG: 3 MMHG
BH CV ECHO MEAS - LV SYSTOLIC VOL/BSA (12-30): 15.4 CM2
BH CV ECHO MEAS - LV V1 MAX: 109 CM/SEC
BH CV ECHO MEAS - LV V1 VTI: 20.9 CM
BH CV ECHO MEAS - LVIDD: 5 CM
BH CV ECHO MEAS - LVIDS: 3 CM
BH CV ECHO MEAS - LVOT AREA: 3.5 CM2
BH CV ECHO MEAS - LVOT DIAM: 2.1 CM
BH CV ECHO MEAS - LVPWD: 1 CM
BH CV ECHO MEAS - MV A DUR: 0.09 SEC
BH CV ECHO MEAS - MV A MAX VEL: 104 CM/SEC
BH CV ECHO MEAS - MV DEC SLOPE: 962 CM/SEC2
BH CV ECHO MEAS - MV DEC TIME: 0.18 SEC
BH CV ECHO MEAS - MV E MAX VEL: 90.1 CM/SEC
BH CV ECHO MEAS - MV E/A: 0.87
BH CV ECHO MEAS - MV MAX PG: 3.8 MMHG
BH CV ECHO MEAS - MV MEAN PG: 2 MMHG
BH CV ECHO MEAS - MV P1/2T: 30.4 MSEC
BH CV ECHO MEAS - MV V2 VTI: 23.4 CM
BH CV ECHO MEAS - MVA(P1/2T): 7.2 CM2
BH CV ECHO MEAS - MVA(VTI): 3.1 CM2
BH CV ECHO MEAS - PA ACC TIME: 0.16 SEC
BH CV ECHO MEAS - PA V2 MAX: 101 CM/SEC
BH CV ECHO MEAS - RV MAX PG: 2.6 MMHG
BH CV ECHO MEAS - RV V1 MAX: 80.6 CM/SEC
BH CV ECHO MEAS - RV V1 VTI: 21.4 CM
BH CV ECHO MEAS - SV(LVOT): 72.4 ML
BH CV ECHO MEAS - SV(MOD-SP4): 67.6 ML
BH CV ECHO MEAS - SVI(LVOT): 30.6 ML/M2
BH CV ECHO MEAS - SVI(MOD-SP4): 28.5 ML/M2
SINUS: 2.8 CM
STJ: 2.5 CM

## 2025-02-08 PROCEDURE — 99223 1ST HOSP IP/OBS HIGH 75: CPT | Performed by: NURSE PRACTITIONER

## 2025-02-08 PROCEDURE — 96372 THER/PROPH/DIAG INJ SC/IM: CPT

## 2025-02-08 PROCEDURE — G0378 HOSPITAL OBSERVATION PER HR: HCPCS

## 2025-02-08 PROCEDURE — 25010000002 ENOXAPARIN PER 10 MG: Performed by: FAMILY MEDICINE

## 2025-02-08 RX ORDER — AZITHROMYCIN 250 MG/1
500 TABLET, FILM COATED ORAL
Status: DISCONTINUED | OUTPATIENT
Start: 2025-02-08 | End: 2025-02-09 | Stop reason: HOSPADM

## 2025-02-08 RX ADMIN — AZITHROMYCIN 500 MG: 250 TABLET, FILM COATED ORAL at 18:08

## 2025-02-08 RX ADMIN — PANTOPRAZOLE SODIUM 40 MG: 40 TABLET, DELAYED RELEASE ORAL at 09:22

## 2025-02-08 RX ADMIN — HYOSCYAMINE SULFATE 250 MCG: 0.12 TABLET SUBLINGUAL at 12:06

## 2025-02-08 RX ADMIN — SUCRALFATE 1 G: 1 TABLET ORAL at 09:22

## 2025-02-08 RX ADMIN — MONTELUKAST 10 MG: 10 TABLET, FILM COATED ORAL at 21:12

## 2025-02-08 RX ADMIN — ENOXAPARIN SODIUM 40 MG: 100 INJECTION SUBCUTANEOUS at 21:12

## 2025-02-08 RX ADMIN — ENOXAPARIN SODIUM 40 MG: 100 INJECTION SUBCUTANEOUS at 09:22

## 2025-02-08 RX ADMIN — ALPRAZOLAM 0.5 MG: 0.5 TABLET ORAL at 09:22

## 2025-02-08 RX ADMIN — IBUPROFEN 800 MG: 400 TABLET, FILM COATED ORAL at 09:22

## 2025-02-08 RX ADMIN — ASPIRIN 81 MG CHEWABLE TABLET 81 MG: 81 TABLET CHEWABLE at 09:22

## 2025-02-08 RX ADMIN — SUCRALFATE 1 G: 1 TABLET ORAL at 21:12

## 2025-02-08 RX ADMIN — Medication 10 ML: at 09:23

## 2025-02-08 RX ADMIN — HYOSCYAMINE SULFATE 250 MCG: 0.12 TABLET SUBLINGUAL at 17:06

## 2025-02-08 NOTE — PLAN OF CARE
Problem: Adult Inpatient Plan of Care  Goal: Plan of Care Review  Outcome: Progressing  Goal: Patient-Specific Goal (Individualized)  Outcome: Progressing  Goal: Absence of Hospital-Acquired Illness or Injury  Outcome: Progressing  Intervention: Identify and Manage Fall Risk  Recent Flowsheet Documentation  Taken 2/7/2025 2000 by Shea Casper, RN  Safety Promotion/Fall Prevention:   fall prevention program maintained   safety round/check completed  Intervention: Prevent Infection  Recent Flowsheet Documentation  Taken 2/7/2025 2000 by Shea Casper RN  Infection Prevention: rest/sleep promoted  Goal: Optimal Comfort and Wellbeing  Outcome: Progressing  Goal: Readiness for Transition of Care  Outcome: Progressing     Problem: Comorbidity Management  Goal: Blood Pressure in Desired Range  Outcome: Progressing   Goal Outcome Evaluation:

## 2025-02-08 NOTE — CONSULTS
GI CONSULT  NOTE:    Referring Provider:     Maegan     Chief complaint:    Nausea, vomiting, abdominal pain    Subjective   Chest pain, shortness of air    History of present illness:     Patient is a 34-year-old female with a history of GERD who was a direct admit to the hospital on 2/7/2025 for chest pain, shortness of air, nausea and vomiting for 24 hours.  GI was consulted for nausea vomiting and abdominal pain.    Patient states she has had intermittent nausea since December when stomach issues started.  She denies ever having vomiting.  Patient states she was placed on omeprazole Pepcid and Carafate since December.  Patient complains of chewing her food very well and then feeling like it gets slowed down in her esophagus.  Patient states she has no problems with meats or breads getting stuck and having to vomit them back up.  This just that she feels like her food goes down slowly.  Patient has had a video swallow on 1/21/2025 that she states was normal.  She also had a right upper quadrant ultrasound that just showed some fatty liver, gallbladder was normal.   Patient states since starting GI medications her stool is soft and occasionally will skip a day.  Her last bowel movement was Thursday, 2/6/2025.  States she only sees blood on the toilet paper when her hemorrhoids are acting up which they are not currently.  Patient denies taking any NSAIDs.  Patient states she stopped smoking tobacco in October.  Does not smoke marijuana.  Rare alcohol usage.    Patient states she was on her period and spotting when she had her urinalysis done accounting for the blood in her urine.  Patient states she was 7 pounds lighter in 3 days at the doctor's office.  But she admits she had a heavy coat on with various things in her pockets the first time she was weighed.  She has not noticed a significant looseness in her close or decrease in appetite.    LABS: Respiratory panel negative.  Urinalysis negative except for  trace amount of blood.  WBCs 9.28, hemoglobin 12.3 and platelets 338.  Amylase 38, CRP 0.94.  Lipase 11.  CMP absolutely normal.  Chest x-ray questionable pneumonia    Endo History:  Nothing in G med.  No endoscopies.    Past Medical History:  History reviewed. No pertinent past medical history.    Past Surgical History:  History reviewed. No pertinent surgical history.    Social History:  Social History     Tobacco Use    Smoking status: Former     Types: Cigarettes     Start date:      Quit date:      Years since quittin.1   Vaping Use    Vaping status: Never Used   Substance Use Topics    Alcohol use: Not Currently    Drug use: Never       Family History:  History reviewed. No pertinent family history.    Medications:  Medications Prior to Admission   Medication Sig Dispense Refill Last Dose/Taking    ALPRAZolam (XANAX) 0.5 MG tablet Take 1 tablet by mouth Daily.   Past Week    aspirin 81 MG chewable tablet Chew 1 tablet Daily.   2025 Morning    diclofenac sodium (VOTAREN XR) 100 MG 24 hr tablet Take 1 tablet by mouth Every 12 (Twelve) Hours.   Past Week    famotidine (PEPCID) 40 MG tablet Take 1 tablet by mouth Daily.   2025    metoprolol succinate XL (TOPROL-XL) 25 MG 24 hr tablet Take 1 tablet by mouth Daily.   2025    montelukast (SINGULAIR) 10 MG tablet Take 1 tablet by mouth Every Night.   2025    nitroglycerin (NITROSTAT) 0.4 MG SL tablet Place 1 tablet under the tongue Every 5 (Five) Minutes As Needed for Chest Pain.   Past Month    omeprazole (priLOSEC) 40 MG capsule Take 1 capsule by mouth Daily.   2025    pantoprazole (PROTONIX) 40 MG EC tablet Take 1 tablet by mouth Daily. 14 tablet 0 2025    sucralfate (CARAFATE) 1 g tablet Take 1 tablet by mouth Every 12 (Twelve) Hours.   2025       Scheduled Meds:ALPRAZolam, 0.5 mg, Oral, Daily  aspirin, 81 mg, Oral, Daily  enoxaparin, 40 mg, Subcutaneous, BID  ibuprofen, 800 mg, Oral, TID With Meals  metoprolol succinate  XL, 25 mg, Oral, Daily  montelukast, 10 mg, Oral, Nightly  pantoprazole, 40 mg, Oral, Daily  sodium chloride, 10 mL, Intravenous, Q12H  sucralfate, 1 g, Oral, Q12H      Continuous Infusions:   PRN Meds:.  aluminum-magnesium hydroxide-simethicone    senna-docusate sodium **AND** polyethylene glycol **AND** bisacodyl **AND** bisacodyl    Calcium Replacement - Follow Nurse / BPA Driven Protocol    Magnesium Standard Dose Replacement - Follow Nurse / BPA Driven Protocol    nitroglycerin    ondansetron    Phosphorus Replacement - Follow Nurse / BPA Driven Protocol    Potassium Replacement - Follow Nurse / BPA Driven Protocol    sodium chloride    sodium chloride    ALLERGIES:  Amoxicillin and Penicillins    ROS:  Review of Systems   HENT:  Negative for trouble swallowing.    Gastrointestinal:  Positive for anal bleeding, constipation and nausea. Negative for vomiting.   Psychiatric/Behavioral:  The patient is nervous/anxious.      The following systems were reviewed and negative;   Constitution:  No fevers, chills, no unintentional weight loss  Skin: no rash, no jaundice  Eyes:  No blurry vision, no eye pain  HENT:  No change in hearing or smell  Resp:  No dyspnea or cough  CV:  No chest pain or palpitations  :  No dysuria, hematuria  Musculoskeletal:  No leg cramps or arthralgias  Neuro:  No tremor, no numbness  Psych:  No depression or confusion    Objective resting comfortably in hospital bed.  NAD.  Room 214.  No family present.    Vital Signs:   Vitals:    02/07/25 1910 02/07/25 2238 02/08/25 0313 02/08/25 0745   BP: 121/69 139/76 115/56 108/64   BP Location: Right arm Right arm Right arm Right arm   Patient Position: Lying Lying Lying Lying   Pulse: 59 70 70 65   Resp: 12 12 14 13   Temp: 98.1 °F (36.7 °C) 98 °F (36.7 °C) 97.5 °F (36.4 °C) 97.5 °F (36.4 °C)   TempSrc: Oral Oral Oral Oral   SpO2: 99% 100% 97% 98%   Weight:   127 kg (279 lb 5.2 oz) 127 kg (279 lb 5.2 oz)   Height:           Physical Exam:   General  "Appearance:    Awake and alert, in no acute distress   Head:    Normocephalic, without obvious abnormality, atraumatic   Eyes:            Conjunctivae normal, anicteric sclerae, pupils equal   Ears:    Ears appear intact with no abnormalities noted   Throat:   No oral lesions, no thrush, oral mucosa moist   Neck:   Supple, no JVD   Lungs:      respirations regular, even and unlabored        Chest Wall:    No abnormalities observed   Abdomen:      soft, nontender, no rebound or guarding, nondistended, obese abdomen   Rectal:     Deferred   Extremities:   Moves all extremities, no edema, no cyanosis   Pulses:   Pulses palpable and equal bilaterally   Skin:   No rash, no jaundice, normal palpation        Neurologic:   Cranial nerves 2 - 12 grossly intact, no asterixis       Results Review:   I reviewed the patient's labs and imaging.  CBC    Results from last 7 days   Lab Units 02/07/25 2243 02/07/25  1643   WBC 10*3/mm3 9.28 8.02   HEMOGLOBIN g/dL 12.3 12.9   PLATELETS 10*3/mm3 338 354     CMP   Results from last 7 days   Lab Units 02/07/25 2243 02/07/25  1643   SODIUM mmol/L 141 142   POTASSIUM mmol/L 4.1 3.8   CHLORIDE mmol/L 105 105   CO2 mmol/L 26.7 25.8   BUN mg/dL 10 8   CREATININE mg/dL 1.00 0.91   GLUCOSE mg/dL 78 90   ALBUMIN g/dL 4.4 5.0   BILIRUBIN mg/dL 0.7 0.7   ALK PHOS U/L 71 77   AST (SGOT) U/L 21 28   ALT (SGPT) U/L 21 24   AMYLASE U/L  --  38   LIPASE U/L  --  11*     Cr Clearance Estimated Creatinine Clearance: 110.4 mL/min (by C-G formula based on SCr of 1 mg/dL).  Coag     HbA1C No results found for: \"HGBA1C\"  Blood Glucose No results found for: \"POCGLU\"  Infection     UA    Results from last 7 days   Lab Units 02/07/25  1642   NITRITE UA  Negative   WBC UA /HPF 0-2   BACTERIA UA /HPF None Seen   SQUAM EPITHEL UA /HPF 0-2     Radiology(recent) XR Chest PA & Lateral    Result Date: 2/7/2025  Impression: Ill-defined airspace opacities in the lingula which may suggest pneumonia. Electronically " "Signed: Conrado Ford MD  2/7/2025 9:30 PM EST  Workstation ID: [id number]       ASSESSMENT:  Nausea with epigastric pain consider uncontrolled GERD, gastritis vs ulcer   Esophageal dysmotility vs spasm   Chest pain GI vs cardiac  Constipation  Fatty liver     PLAN:  Patient is a 34-year-old female who has been having \"stomach issues\" since December 2024.  She has been taking Pepcid, omeprazole and Carafate.  Has had nausea but no vomiting.  Esophageal dysmotility.  Some chest pain and shortness of air.  She is here for evaluation by cardiac and GI.      I have recommended she only take omeprazole or Pepcid.  I have instructed her to take Carafate twice a day as a slurry.  Labs are unremarkable.  Ultrasound just shows fatty liver.  Avoid NSAIDs.  Tylenol is okay.  Recommend outpatient esophageal motility study for suspected esophageal dysmotility. Per UTD, \"hyoscyamine 0.25 mg, orally, three times daily as needed was associated with reduced esophageal contractility and lower esophageal sphincter (??S) pressure.\"  She is not having food get stuck or impact on her is just that she feels her food go down her esophagus slowly.  Can also consider amitriptyline which could help with her anxiety also.   Will recommend soft diet.  Cardiac workup pending.    I discussed the patient's findings and my recommendations with the patient.  RADHA Mora  02/08/25  08:16 EST    Time:         "

## 2025-02-08 NOTE — PROGRESS NOTES
LOS: 0 days   Patient Care Team:  Tati Issa MD as PCP - General (Family Medicine)        Subjective  sitting on bed    Interval History:     Patient Complaints:  chest discomfort on and off    Review of Systems   Constitutional:  Positive for activity change, appetite change and fatigue.   Eyes: Negative.    Respiratory:  Positive for cough, chest tightness and shortness of breath.    Cardiovascular:  Positive for chest pain.   Gastrointestinal:  Positive for nausea.   Endocrine: Negative.    Genitourinary: Negative.    Musculoskeletal: Negative.    Neurological:  Positive for dizziness, light-headedness and headaches.   Psychiatric/Behavioral:  The patient is nervous/anxious.         Objective     Vital Signs  Temp:  [97.5 °F (36.4 °C)-98.1 °F (36.7 °C)] 97.5 °F (36.4 °C)  Heart Rate:  [59-71] 71  Resp:  [10-14] 12  BP: (108-139)/(56-76) 123/67    Physical Exam:     General Appearance:    Alert, cooperative, in no acute distress   Ears:    Ears appear intact with no abnormalities noted   Throat:   No oral lesions, no thrush, oral mucosa moist   Neck:   No adenopathy, supple, trachea midline, no thyromegaly, no   carotid bruit, no JVD   Lungs:     Clear to auscultation, respirations regular, even and               Unlabored     Heart:    Regular rhythm and normal rate, normal S1 and S2, no          murmur, no gallop, no rub, no click   Abdomen:     Normal bowel sounds, no masses, no organomegaly, soft      nontender, nondistended, no guarding, no rebound                tenderness   Extremities:   Moves all extremities well, no edema, no cyanosis, no           redness   Skin:   No bleeding, bruising or rash   Neurologic:   Cranial nerves 2 - 12 grossly intact, sensation intact, DTR       present and equal bilaterally        Results Review:    Lab Results (last 24 hours)       Procedure Component Value Units Date/Time    Comprehensive Metabolic Panel [[id number]] Collected: 02/07/25 4100    Specimen:  Blood from Arm, Right Updated: 02/07/25 2338     Glucose 78 mg/dL      BUN 10 mg/dL      Creatinine 1.00 mg/dL      Sodium 141 mmol/L      Potassium 4.1 mmol/L      Chloride 105 mmol/L      CO2 26.7 mmol/L      Calcium 9.9 mg/dL      Total Protein 7.7 g/dL      Albumin 4.4 g/dL      ALT (SGPT) 21 U/L      AST (SGOT) 21 U/L      Alkaline Phosphatase 71 U/L      Total Bilirubin 0.7 mg/dL      Globulin 3.3 gm/dL      A/G Ratio 1.3 g/dL      BUN/Creatinine Ratio 10.0     Anion Gap 9.3 mmol/L      eGFR 75.5 mL/min/1.73     Narrative:      GFR Categories in Chronic Kidney Disease (CKD)      GFR Category          GFR (mL/min/1.73)    Interpretation  G1                     90 or greater         Normal or high (1)  G2                      60-89                Mild decrease (1)  G3a                   45-59                Mild to moderate decrease  G3b                   30-44                Moderate to severe decrease  G4                    15-29                Severe decrease  G5                    14 or less           Kidney failure          (1)In the absence of evidence of kidney disease, neither GFR category G1 or G2 fulfill the criteria for CKD.    eGFR calculation 2021 CKD-EPI creatinine equation, which does not include race as a factor    CBC & Differential [[id number]]  (Abnormal) Collected: 02/07/25 2243    Specimen: Blood from Arm, Right Updated: 02/07/25 2315    Narrative:      The following orders were created for panel order CBC & Differential.  Procedure                               Abnormality         Status                     ---------                               -----------         ------                     CBC Auto Differential[[id number]]        Abnormal            Final result                 Please view results for these tests on the individual orders.    CBC Auto Differential [[id number]]  (Abnormal) Collected: 02/07/25 2243    Specimen: Blood from Arm, Right Updated: 02/07/25 2315     WBC 9.28 10*3/mm3       RBC 4.28 10*6/mm3      Hemoglobin 12.3 g/dL      Hematocrit 38.6 %      MCV 90.2 fL      MCH 28.7 pg      MCHC 31.9 g/dL      RDW 12.2 %      RDW-SD 39.9 fl      MPV 9.6 fL      Platelets 338 10*3/mm3      Neutrophil % 50.4 %      Lymphocyte % 39.0 %      Monocyte % 8.4 %      Eosinophil % 1.4 %      Basophil % 0.6 %      Immature Grans % 0.2 %      Neutrophils, Absolute 4.67 10*3/mm3      Lymphocytes, Absolute 3.62 10*3/mm3      Monocytes, Absolute 0.78 10*3/mm3      Eosinophils, Absolute 0.13 10*3/mm3      Basophils, Absolute 0.06 10*3/mm3      Immature Grans, Absolute 0.02 10*3/mm3      nRBC 0.0 /100 WBC     Respiratory Panel PCR w/COVID-19(SARS-CoV-2) [facility] In-House, NP Swab in UTM/VTM, 2 HR TAT - Swab, Nasopharynx [[id number]]  (Normal) Collected: 02/07/25 2152    Specimen: Swab from Nasopharynx Updated: 02/07/25 2253     ADENOVIRUS, PCR Not Detected     Coronavirus 229E Not Detected     Coronavirus HKU1 Not Detected     Coronavirus NL63 Not Detected     Coronavirus OC43 Not Detected     COVID19 Not Detected     Human Metapneumovirus Not Detected     Human Rhinovirus/Enterovirus Not Detected     Influenza A PCR Not Detected     Influenza B PCR Not Detected     Parainfluenza Virus 1 Not Detected     Parainfluenza Virus 2 Not Detected     Parainfluenza Virus 3 Not Detected     Parainfluenza Virus 4 Not Detected     RSV, PCR Not Detected     Bordetella pertussis pcr Not Detected     Bordetella parapertussis PCR Not Detected     Chlamydophila pneumoniae PCR Not Detected     Mycoplasma pneumo by PCR Not Detected    Narrative:      In the setting of a positive respiratory panel with a viral infection PLUS a negative procalcitonin without other underlying concern for bacterial infection, consider observing off antibiotics or discontinuation of antibiotics and continue supportive care. If the respiratory panel is positive for atypical bacterial infection (Bordetella pertussis, Chlamydophila  pneumoniae, or Mycoplasma pneumoniae), consider antibiotic de-escalation to target atypical bacterial infection.    CK Total & CKMB [[id number]]  (Normal) Collected: 02/07/25 1643    Specimen: Blood Updated: 02/07/25 2037     CKMB <1.00 ng/mL      Creatine Kinase 80 U/L     Narrative:      CKMB results may be falsely decreased if patient taking Biotin.    Myoglobin, Serum [[id number]]  (Normal) Collected: 02/07/25 1643    Specimen: Blood Updated: 02/07/25 2015     Myoglobin 38.2 ng/mL     Narrative:      Results may be falsely decreased if patient taking Biotin.      High Sensitivity Troponin T 1Hr [[id number]] Collected: 02/07/25 1851    Specimen: Blood Updated: 02/07/25 1946     HS Troponin T <6 ng/L      Troponin T Numeric Delta --     Comment: Unable to calculate.       Narrative:      High Sensitive Troponin T Reference Range:  <14.0 ng/L- Negative Female for AMI  <22.0 ng/L- Negative Male for AMI  >=14 - Abnormal Female indicating possible myocardial injury.  >=22 - Abnormal Male indicating possible myocardial injury.   Clinicians would have to utilize clinical acumen, EKG, Troponin, and serial changes to determine if it is an Acute Myocardial Infarction or myocardial injury due to an underlying chronic condition.         Comprehensive Metabolic Panel [[id number]] Collected: 02/07/25 1643    Specimen: Blood Updated: 02/07/25 1839     Glucose 90 mg/dL      BUN 8 mg/dL      Creatinine 0.91 mg/dL      Sodium 142 mmol/L      Potassium 3.8 mmol/L      Chloride 105 mmol/L      CO2 25.8 mmol/L      Calcium 9.8 mg/dL      Total Protein 8.5 g/dL      Albumin 5.0 g/dL      ALT (SGPT) 24 U/L      AST (SGOT) 28 U/L      Alkaline Phosphatase 77 U/L      Total Bilirubin 0.7 mg/dL      Globulin 3.5 gm/dL      A/G Ratio 1.4 g/dL      BUN/Creatinine Ratio 8.8     Anion Gap 11.2 mmol/L      eGFR 84.5 mL/min/1.73     Narrative:      GFR Categories in Chronic Kidney Disease (CKD)      GFR Category          GFR (mL/min/1.73)     Interpretation  G1                     90 or greater         Normal or high (1)  G2                      60-89                Mild decrease (1)  G3a                   45-59                Mild to moderate decrease  G3b                   30-44                Moderate to severe decrease  G4                    15-29                Severe decrease  G5                    14 or less           Kidney failure          (1)In the absence of evidence of kidney disease, neither GFR category G1 or G2 fulfill the criteria for CKD.    eGFR calculation 2021 CKD-EPI creatinine equation, which does not include race as a factor    High Sensitivity Troponin T [[id number]]  (Normal) Collected: 02/07/25 1643    Specimen: Blood Updated: 02/07/25 1839     HS Troponin T <6 ng/L     Narrative:      High Sensitive Troponin T Reference Range:  <14.0 ng/L- Negative Female for AMI  <22.0 ng/L- Negative Male for AMI  >=14 - Abnormal Female indicating possible myocardial injury.  >=22 - Abnormal Male indicating possible myocardial injury.   Clinicians would have to utilize clinical acumen, EKG, Troponin, and serial changes to determine if it is an Acute Myocardial Infarction or myocardial injury due to an underlying chronic condition.         C-reactive Protein [[id number]]  (Abnormal) Collected: 02/07/25 1643    Specimen: Blood Updated: 02/07/25 1839     C-Reactive Protein 0.94 mg/dL     Lipase [[id number]]  (Abnormal) Collected: 02/07/25 1643    Specimen: Blood Updated: 02/07/25 1839     Lipase 11 U/L     Amylase [[id number]]  (Normal) Collected: 02/07/25 1643    Specimen: Blood Updated: 02/07/25 1807     Amylase 38 U/L            Imaging Results (Last 24 Hours)       Procedure Component Value Units Date/Time    XR Chest PA & Lateral [[id number]] Collected: 02/07/25 2129     Updated: 02/07/25 2132    Narrative:      XR CHEST PA AND LATERAL    Date of Exam: 2/7/2025 5:05 PM EST    Indication: shortness of breath    Comparison: 12/19/2024  radiographs    Findings:  Unremarkable cardiomediastinal silhouette. Perihilar peribronchial cuffing. There are mild ill-defined airspace opacities in the lingula. No pleural effusion or pneumothorax. No acute osseous abnormality.      Impression:      Impression:  Ill-defined airspace opacities in the lingula which may suggest pneumonia.        Electronically Signed: Conrado Ford MD    2/7/2025 9:30 PM EST    Workstation ID: [id number]             I reviewed the patient's other test results and agree with the interpretation    Medication Review:     Current Facility-Administered Medications:     ALPRAZolam (XANAX) tablet 0.5 mg, 0.5 mg, Oral, Daily, Tati Issa MD, 0.5 mg at 02/08/25 0922    aluminum-magnesium hydroxide-simethicone (MAALOX MAX) 400-400-40 MG/5ML suspension 15 mL, 15 mL, Oral, Q6H PRN, Tati Issa MD    aspirin chewable tablet 81 mg, 81 mg, Oral, Daily, Tati Issa MD, 81 mg at 02/08/25 0922    azithromycin (ZITHROMAX) tablet 500 mg, 500 mg, Oral, Q24H, Tati Issa MD    sennosides-docusate (PERICOLACE) 8.6-50 MG per tablet 2 tablet, 2 tablet, Oral, BID PRN **AND** polyethylene glycol (MIRALAX) packet 17 g, 17 g, Oral, Daily PRN **AND** bisacodyl (DULCOLAX) EC tablet 5 mg, 5 mg, Oral, Daily PRN **AND** bisacodyl (DULCOLAX) suppository 10 mg, 10 mg, Rectal, Daily PRN, Tati Issa MD    Calcium Replacement - Follow Nurse / BPA Driven Protocol, , Not Applicable, PRN, Tati Issa MD    Enoxaparin Sodium (LOVENOX) syringe 40 mg, 40 mg, Subcutaneous, BID, Tati Issa MD, 40 mg at 02/08/25 0922    hyoscyamine (LEVSIN) SL tablet 250 mcg, 250 mcg, Sublingual, TID With Meals, Camila Martínez APRN, 250 mcg at 02/08/25 1706    Magnesium Standard Dose Replacement - Follow Nurse / BPA Driven Protocol, , Not Applicable, PRN, Tati Issa MD    metoprolol succinate XL (TOPROL-XL) 24 hr tablet 25 mg, 25 mg, Oral, Daily,  Tati Issa MD    montelukast (SINGULAIR) tablet 10 mg, 10 mg, Oral, Nightly, Tati Issa MD, 10 mg at 02/07/25 2053    nitroglycerin (NITROSTAT) SL tablet 0.4 mg, 0.4 mg, Sublingual, Q5 Min PRN, Tati Issa MD    ondansetron (ZOFRAN) injection 4 mg, 4 mg, Intravenous, Q6H PRN, Tati Issa MD    pantoprazole (PROTONIX) EC tablet 40 mg, 40 mg, Oral, Daily, Tati Issa MD, 40 mg at 02/08/25 0922    Phosphorus Replacement - Follow Nurse / BPA Driven Protocol, , Not Applicable, Maegan DEGROOT Dhamayanthi, MD    Potassium Replacement - Follow Nurse / BPA Driven Protocol, , Not Applicable, PRMaegan GILLIAM Dhamayanthi, MD    sodium chloride 0.9 % flush 10 mL, 10 mL, Intravenous, Q12H, Tati Issa MD, 10 mL at 02/08/25 0923    sodium chloride 0.9 % flush 10 mL, 10 mL, Intravenous, PRN, Tati Issa MD    sodium chloride 0.9 % infusion 40 mL, 40 mL, Intravenous, PRN, Tati Issa MD    sucralfate (CARAFATE) tablet 1 g, 1 g, Oral, Q12H, Tati Issa MD, 1 g at 02/08/25 0922    I have reviewed medication list.    Assessment & Plan     Principal Problem:   Chest pain with SOB   -  , 2D ECHO and cardiology consulted, cardiac enzymes and EKG negative     Dysphagia, N/V with wt loss -  GI consulted, PPI     Pneumonia - abx     Generalized anxiety disorder - continue      Major recurrent depression - continue medicine     Tobacco abuse - counseled to quit smoking      Stress ulcer/DVT prophylaxis               Plan for disposition: Home at discharge     Tati Issa MD  02/08/25  17:21 EST

## 2025-02-08 NOTE — CONSULTS
[facility] (formerly [facility])[3] CARDIOLOGY CONSULT  [facility]        Subjective:     Encounter Date:2025      Patient ID: Digna West is a 34 y.o. female.    Chief Complaint: Chest pain      HPI:  Digna West is a 34 y.o. female without prior cardiac history.  PMH includes HTN, HLD, thyroid dysfunction, GERD, and strong family history of premature CAD of a first-degree relative.  Patient was recently evaluated by Dr. Ayoub for chest pain and was planned for nuclear stress testing and 2D echo as an outpatient.  She presented to the ER with complaints of chest pain and cardiology consulted for further evaluation and management.    Patient complains of intermittent chest discomfort since November.  She describes this as midsternal radiating through her to her back lasting 10 to 15 minutes at a time.  She states it is primarily precipitated by emotional stress.  Sometimes her SBP gets into the 140s.  She denies shortness of breath.  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  She also complains of issues with nausea and epigastric pain radiating midsternal since the fall.  She reports a tremendous amount of stress currently in which she is raising her brother's children, caring for her father, and a nursing student.  She takes metoprolol as needed off label for anxiety attacks.      History reviewed. No pertinent past medical history.      History reviewed. No pertinent surgical history.      Social History     Socioeconomic History    Marital status:    Tobacco Use    Smoking status: Former     Types: Cigarettes     Start date:      Quit date:      Years since quittin.1   Vaping Use    Vaping status: Never Used   Substance and Sexual Activity    Alcohol use: Not Currently    Drug use: Never    Sexual activity: Defer   Patient quit smoking in 10/2024.    History reviewed. No pertinent family  "history.  Patient's brother had PCI at age 33.  Patient's father had PCI in his 40s and bypass in his 50s.  Mother  of complications of sepsis.    Allergies   Allergen Reactions    Amoxicillin GI Bleeding    Penicillins Rash and GI Bleeding       Current Medications:   Scheduled Meds:ALPRAZolam, 0.5 mg, Oral, Daily  aspirin, 81 mg, Oral, Daily  enoxaparin, 40 mg, Subcutaneous, BID  hyoscyamine, 250 mcg, Sublingual, TID With Meals  metoprolol succinate XL, 25 mg, Oral, Daily  montelukast, 10 mg, Oral, Nightly  pantoprazole, 40 mg, Oral, Daily  sodium chloride, 10 mL, Intravenous, Q12H  sucralfate, 1 g, Oral, Q12H      Continuous Infusions:     ROS  All other systems reviewed and are negative.       Objective:         /72 (BP Location: Right arm, Patient Position: Sitting)   Pulse 71   Temp 97.8 °F (36.6 °C) (Oral)   Resp 10   Ht 172.7 cm (68\")   Wt 127 kg (279 lb 5.2 oz)   SpO2 98%   BMI 42.47 kg/m²       General: Well-developed in NAD.  Neuro: AAOx3. No gross deficits.  HEENT: Sclerae clear, no xanthelasmas.  CV: S1S2, RRR. No murmurs or gallops.  Resp: Breathing is unlabored. Lungs CTA throughout.  GI: BS+. Abdomen soft and NTTP.  Ext: Pedal pulses are palpable. Extremities are nonedematous.  MS: moves all extremities, no weakness.  Skin: warm, dry.  Psych: calm and cooperative.            Lab Review:     Results from last 7 days   Lab Units 25  2243 25  1643   SODIUM mmol/L 141 142   POTASSIUM mmol/L 4.1 3.8   CHLORIDE mmol/L 105 105   CO2 mmol/L 26.7 25.8   BUN mg/dL 10 8   CREATININE mg/dL 1.00 0.91   GLUCOSE mg/dL 78 90   CALCIUM mg/dL 9.9 9.8   AST (SGOT) U/L 21 28   ALT (SGPT) U/L 21 24     Results from last 7 days   Lab Units 25  1851 25  1643   CK TOTAL U/L  --  80   HSTROP T ng/L <6 <6     Results from last 7 days   Lab Units 25  2243 25  1643   WBC 10*3/mm3 9.28 8.02   HEMOGLOBIN g/dL 12.3 12.9   HEMATOCRIT % 38.6 40.0   PLATELETS 10*3/mm3 338 354       " "            Invalid input(s): \"LDLCALC\"            Recent Radiology:  Imaging Results (Most Recent)       Procedure Component Value Units Date/Time    XR Chest PA & Lateral [[id number]] Collected: 02/07/25 2129     Updated: 02/07/25 2132    Narrative:      XR CHEST PA AND LATERAL    Date of Exam: 2/7/2025 5:05 PM EST    Indication: shortness of breath    Comparison: 12/19/2024 radiographs    Findings:  Unremarkable cardiomediastinal silhouette. Perihilar peribronchial cuffing. There are mild ill-defined airspace opacities in the lingula. No pleural effusion or pneumothorax. No acute osseous abnormality.      Impression:      Impression:  Ill-defined airspace opacities in the lingula which may suggest pneumonia.        Electronically Signed: Conrado Ford MD    2/7/2025 9:30 PM EST    Workstation ID: [id number]              ECHOCARDIOGRAM:              Assessment:         Active Hospital Problems    Diagnosis  POA    **Chest pain [R07.9]  Yes     1) Chest Pain  -High-sensitivity troponin negative x 2  -EKG shows no acute ST abnormality  -CXR shows opacities suspicious for pneumonia    2) strong family history of premature CAD for first-degree relative    3) nausea  - GI planning PPI and outpatient motility study    4) HTN    5) HLD    6) history of thyroid dysfunction    7) recently reformed smoker           Plan:   Patient has been ruled out for ACS.  2D echo is pending.  Check TSH.  Will proceed with nuclear stress testing in the a.m.  Case has been discussed with Dr. Coleman.           Electronically signed by RADHA Dallas, 02/08/25, 12:35 PM EST.   "

## 2025-02-09 ENCOUNTER — APPOINTMENT (OUTPATIENT)
Dept: NUCLEAR MEDICINE | Facility: HOSPITAL | Age: 35
End: 2025-02-09
Payer: COMMERCIAL

## 2025-02-09 VITALS
OXYGEN SATURATION: 98 % | SYSTOLIC BLOOD PRESSURE: 152 MMHG | DIASTOLIC BLOOD PRESSURE: 75 MMHG | TEMPERATURE: 98.4 F | WEIGHT: 279.32 LBS | HEIGHT: 68 IN | RESPIRATION RATE: 16 BRPM | HEART RATE: 84 BPM | BODY MASS INDEX: 42.33 KG/M2

## 2025-02-09 PROBLEM — R07.9 CHEST PAIN: Status: RESOLVED | Noted: 2025-02-07 | Resolved: 2025-02-09

## 2025-02-09 LAB
ALBUMIN SERPL-MCNC: 4 G/DL (ref 3.5–5.2)
ALBUMIN/GLOB SERPL: 1.4 G/DL
ALP SERPL-CCNC: 66 U/L (ref 39–117)
ALT SERPL W P-5'-P-CCNC: 19 U/L (ref 1–33)
ANION GAP SERPL CALCULATED.3IONS-SCNC: 9.1 MMOL/L (ref 5–15)
AST SERPL-CCNC: 18 U/L (ref 1–32)
BASOPHILS # BLD AUTO: 0.05 10*3/MM3 (ref 0–0.2)
BASOPHILS NFR BLD AUTO: 0.7 % (ref 0–1.5)
BH CV NUCLEAR PRIOR STUDY: 3
BH CV REST NUCLEAR ISOTOPE DOSE: 10.2 MCI
BH CV STRESS BP STAGE 1: NORMAL
BH CV STRESS BP STAGE 2: NORMAL
BH CV STRESS DURATION MIN STAGE 1: 3
BH CV STRESS DURATION MIN STAGE 2: 3
BH CV STRESS DURATION SEC STAGE 1: 0
BH CV STRESS DURATION SEC STAGE 2: 0
BH CV STRESS GRADE STAGE 1: 10
BH CV STRESS GRADE STAGE 2: 12
BH CV STRESS HR STAGE 1: 167
BH CV STRESS HR STAGE 2: 167
BH CV STRESS METS STAGE 1: 5
BH CV STRESS METS STAGE 2: 7.5
BH CV STRESS NUCLEAR ISOTOPE DOSE: 32.6 MCI
BH CV STRESS PROTOCOL 1: NORMAL
BH CV STRESS RECOVERY BP: NORMAL MMHG
BH CV STRESS RECOVERY HR: 169 BPM
BH CV STRESS SPEED STAGE 1: 1.7
BH CV STRESS SPEED STAGE 2: 2.5
BH CV STRESS STAGE 1: 1
BH CV STRESS STAGE 2: 2
BILIRUB SERPL-MCNC: 0.7 MG/DL (ref 0–1.2)
BUN SERPL-MCNC: 12 MG/DL (ref 6–20)
BUN/CREAT SERPL: 12.8 (ref 7–25)
CALCIUM SPEC-SCNC: 9.3 MG/DL (ref 8.6–10.5)
CHLORIDE SERPL-SCNC: 105 MMOL/L (ref 98–107)
CO2 SERPL-SCNC: 24.9 MMOL/L (ref 22–29)
CREAT SERPL-MCNC: 0.94 MG/DL (ref 0.57–1)
DEPRECATED RDW RBC AUTO: 41.9 FL (ref 37–54)
EGFRCR SERPLBLD CKD-EPI 2021: 81.3 ML/MIN/1.73
EOSINOPHIL # BLD AUTO: 0.16 10*3/MM3 (ref 0–0.4)
EOSINOPHIL NFR BLD AUTO: 2.3 % (ref 0.3–6.2)
ERYTHROCYTE [DISTWIDTH] IN BLOOD BY AUTOMATED COUNT: 12.5 % (ref 12.3–15.4)
GLOBULIN UR ELPH-MCNC: 2.8 GM/DL
GLUCOSE SERPL-MCNC: 94 MG/DL (ref 65–99)
HCT VFR BLD AUTO: 38.8 % (ref 34–46.6)
HGB BLD-MCNC: 12.1 G/DL (ref 12–15.9)
IMM GRANULOCYTES # BLD AUTO: 0.01 10*3/MM3 (ref 0–0.05)
IMM GRANULOCYTES NFR BLD AUTO: 0.1 % (ref 0–0.5)
LYMPHOCYTES # BLD AUTO: 3.37 10*3/MM3 (ref 0.7–3.1)
LYMPHOCYTES NFR BLD AUTO: 48.6 % (ref 19.6–45.3)
MAXIMAL PREDICTED HEART RATE: 185 BPM
MCH RBC QN AUTO: 28.8 PG (ref 26.6–33)
MCHC RBC AUTO-ENTMCNC: 31.2 G/DL (ref 31.5–35.7)
MCV RBC AUTO: 92.4 FL (ref 79–97)
MONOCYTES # BLD AUTO: 0.46 10*3/MM3 (ref 0.1–0.9)
MONOCYTES NFR BLD AUTO: 6.6 % (ref 5–12)
NEUTROPHILS NFR BLD AUTO: 2.88 10*3/MM3 (ref 1.7–7)
NEUTROPHILS NFR BLD AUTO: 41.7 % (ref 42.7–76)
NRBC BLD AUTO-RTO: 0 /100 WBC (ref 0–0.2)
PERCENT MAX PREDICTED HR: 90.27 %
PLATELET # BLD AUTO: 306 10*3/MM3 (ref 140–450)
PMV BLD AUTO: 9.6 FL (ref 6–12)
POTASSIUM SERPL-SCNC: 3.9 MMOL/L (ref 3.5–5.2)
PROT SERPL-MCNC: 6.8 G/DL (ref 6–8.5)
RBC # BLD AUTO: 4.2 10*6/MM3 (ref 3.77–5.28)
SODIUM SERPL-SCNC: 139 MMOL/L (ref 136–145)
SPECT HRT GATED+EF W RNC IV: 70 %
STRESS BASELINE BP: NORMAL MMHG
STRESS BASELINE HR: 105 BPM
STRESS PERCENT HR: 106 %
STRESS POST PEAK BP: NORMAL MMHG
STRESS POST PEAK HR: 167 BPM
STRESS TARGET HR: 157 BPM
TSH SERPL DL<=0.05 MIU/L-ACNC: 9.27 UIU/ML (ref 0.27–4.2)
WBC NRBC COR # BLD AUTO: 6.93 10*3/MM3 (ref 3.4–10.8)

## 2025-02-09 PROCEDURE — G0378 HOSPITAL OBSERVATION PER HR: HCPCS

## 2025-02-09 PROCEDURE — 93018 CV STRESS TEST I&R ONLY: CPT | Performed by: INTERNAL MEDICINE

## 2025-02-09 PROCEDURE — 25010000002 ENOXAPARIN PER 10 MG: Performed by: FAMILY MEDICINE

## 2025-02-09 PROCEDURE — 93016 CV STRESS TEST SUPVJ ONLY: CPT | Performed by: NURSE PRACTITIONER

## 2025-02-09 PROCEDURE — A9502 TC99M TETROFOSMIN: HCPCS | Performed by: FAMILY MEDICINE

## 2025-02-09 PROCEDURE — 78452 HT MUSCLE IMAGE SPECT MULT: CPT | Performed by: INTERNAL MEDICINE

## 2025-02-09 PROCEDURE — 96372 THER/PROPH/DIAG INJ SC/IM: CPT

## 2025-02-09 PROCEDURE — 93017 CV STRESS TEST TRACING ONLY: CPT

## 2025-02-09 PROCEDURE — 85025 COMPLETE CBC W/AUTO DIFF WBC: CPT | Performed by: FAMILY MEDICINE

## 2025-02-09 PROCEDURE — 78452 HT MUSCLE IMAGE SPECT MULT: CPT

## 2025-02-09 PROCEDURE — 84443 ASSAY THYROID STIM HORMONE: CPT | Performed by: NURSE PRACTITIONER

## 2025-02-09 PROCEDURE — 80053 COMPREHEN METABOLIC PANEL: CPT | Performed by: FAMILY MEDICINE

## 2025-02-09 PROCEDURE — 34310000005 TECHNETIUM TETROFOSMIN KIT: Performed by: FAMILY MEDICINE

## 2025-02-09 RX ORDER — AZITHROMYCIN 500 MG/1
500 TABLET, FILM COATED ORAL
Qty: 1 TABLET | Refills: 0 | Status: SHIPPED | OUTPATIENT
Start: 2025-02-10 | End: 2025-02-11

## 2025-02-09 RX ADMIN — ASPIRIN 81 MG CHEWABLE TABLET 81 MG: 81 TABLET CHEWABLE at 09:37

## 2025-02-09 RX ADMIN — SUCRALFATE 1 G: 1 TABLET ORAL at 09:38

## 2025-02-09 RX ADMIN — Medication 10 ML: at 09:38

## 2025-02-09 RX ADMIN — TETROFOSMIN 1 DOSE: 1.38 INJECTION, POWDER, LYOPHILIZED, FOR SOLUTION INTRAVENOUS at 07:58

## 2025-02-09 RX ADMIN — ALPRAZOLAM 0.5 MG: 0.5 TABLET ORAL at 09:38

## 2025-02-09 RX ADMIN — TETROFOSMIN 1 DOSE: 1.38 INJECTION, POWDER, LYOPHILIZED, FOR SOLUTION INTRAVENOUS at 09:00

## 2025-02-09 RX ADMIN — AZITHROMYCIN 500 MG: 250 TABLET, FILM COATED ORAL at 09:37

## 2025-02-09 RX ADMIN — PANTOPRAZOLE SODIUM 40 MG: 40 TABLET, DELAYED RELEASE ORAL at 09:37

## 2025-02-09 RX ADMIN — ENOXAPARIN SODIUM 40 MG: 100 INJECTION SUBCUTANEOUS at 09:38

## 2025-02-09 RX ADMIN — HYOSCYAMINE SULFATE 250 MCG: 0.12 TABLET SUBLINGUAL at 10:35

## 2025-02-09 RX ADMIN — HYOSCYAMINE SULFATE 250 MCG: 0.12 TABLET SUBLINGUAL at 17:28

## 2025-02-09 NOTE — PROGRESS NOTES
[facility] CARDIOLOGY  [facility]        LOS:  LOS: 0 days   Patient Name: Digna West  Age/Sex: 34 y.o. female  : 1990  MRN: [id number]    Day of Service: 25   Length of Stay: 0  Encounter Provider: RADHA Dallas  Place of Service: [facility] CARDIOLOGY  Patient Care Team:  Tati Issa MD as PCP - General (Family Medicine)    Subjective:     Chief Complaint:  F/U CP    Subjective:   Patient reports awakening to a brief pinching sensation in her chest that resolved quickly.  Reports nausea is better today.      Current Medications:   Scheduled Meds:ALPRAZolam, 0.5 mg, Oral, Daily  aspirin, 81 mg, Oral, Daily  azithromycin, 500 mg, Oral, Q24H  enoxaparin, 40 mg, Subcutaneous, BID  hyoscyamine, 250 mcg, Sublingual, TID With Meals  metoprolol succinate XL, 25 mg, Oral, Daily  montelukast, 10 mg, Oral, Nightly  pantoprazole, 40 mg, Oral, Daily  sodium chloride, 10 mL, Intravenous, Q12H  sucralfate, 1 g, Oral, Q12H      Continuous Infusions:     Allergies:  Allergies   Allergen Reactions    Amoxicillin GI Bleeding    Penicillins Rash and GI Bleeding       ROS    Objective:     Temp:  [97.5 °F (36.4 °C)-98.2 °F (36.8 °C)] 97.8 °F (36.6 °C)  Heart Rate:  [66-71] 66  Resp:  [10-16] 15  BP: ()/(47-72) 103/47     Intake/Output Summary (Last 24 hours) at 2025 0917  Last data filed at 2025 1700  Gross per 24 hour   Intake 720 ml   Output --   Net 720 ml     Body mass index is 42.47 kg/m².      25  1614 25  0313 25  0745   Weight: 129 kg (283 lb 6.4 oz) 127 kg (279 lb 5.2 oz) 127 kg (279 lb 5.2 oz)         Physical Exam:  Neuro:  CV:  Resp:  GI:  Ext:  Tele: AAOx3, no gross deficits  S1S2 RRR, no murmur  Nonlabored, CTA  BS+, abd soft  Pedal pulses palp, mild non-pitting BLE edema  SR                                                   Lab Review:   Results from last 7 days   Lab Units  "02/09/25  0505 02/07/25  2243   SODIUM mmol/L 139 141   POTASSIUM mmol/L 3.9 4.1   CHLORIDE mmol/L 105 105   CO2 mmol/L 24.9 26.7   BUN mg/dL 12 10   CREATININE mg/dL 0.94 1.00   GLUCOSE mg/dL 94 78   CALCIUM mg/dL 9.3 9.9   AST (SGOT) U/L 18 21   ALT (SGPT) U/L 19 21     Results from last 7 days   Lab Units 02/07/25  1851 02/07/25  1643   CK TOTAL U/L  --  80   HSTROP T ng/L <6 <6     Results from last 7 days   Lab Units 02/09/25  0505 02/07/25  2243   WBC 10*3/mm3 6.93 9.28   HEMOGLOBIN g/dL 12.1 12.3   HEMATOCRIT % 38.8 38.6   PLATELETS 10*3/mm3 306 338                   Invalid input(s): \"LDLCALC\"      Results from last 7 days   Lab Units 02/09/25  0505   TSH uIU/mL 9.270*       Recent Radiology:  Imaging Results (Most Recent)       Procedure Component Value Units Date/Time    XR Chest PA & Lateral [[id number]] Collected: 02/07/25 2129     Updated: 02/07/25 2132    Narrative:      XR CHEST PA AND LATERAL    Date of Exam: 2/7/2025 5:05 PM EST    Indication: shortness of breath    Comparison: 12/19/2024 radiographs    Findings:  Unremarkable cardiomediastinal silhouette. Perihilar peribronchial cuffing. There are mild ill-defined airspace opacities in the lingula. No pleural effusion or pneumothorax. No acute osseous abnormality.      Impression:      Impression:  Ill-defined airspace opacities in the lingula which may suggest pneumonia.        Electronically Signed: Conrado Ford MD    2/7/2025 9:30 PM EST    Workstation ID: [id number]            ECHOCARDIOGRAM:    Results for orders placed during the hospital encounter of 02/07/25    Adult Transthoracic Echo Complete w/ Color, Spectral and Contrast if necessary per protocol    Interpretation Summary    Left ventricular ejection fraction appears to be 61 - 65%.    Left ventricular diastolic function was not assessed.    Estimated right ventricular systolic pressure from tricuspid regurgitation is normal (<35 mmHg).    No significant valvular " pathology.      Electronically signed by Mildred Coleman MD, 02/08/25, 1:07 PM EST.        I reviewed the patient's new clinical results.    EKG:      Assessment:       Chest pain    1) Chest Pain  -High-sensitivity troponin negative x 2  -EKG shows no acute ST abnormality  -CXR shows opacities suspicious for pneumonia  - TSH elevated     2) strong family history of premature CAD for first-degree relative     3) nausea  - GI planning PPI and outpatient motility study     4) HTN     5) HLD     6) history of thyroid dysfunction     7) recently reformed smoker    Plan:   Exercise nuclear stress test completed.  Nuclear images to follow.  No chest pain during test.  Exaggerated heart rate response observed.  2D echo shows a preserved EF and no significant VHD.  If nuclear stress testing is negative, she can follow-up with her primary cardiologist, Dr. Ayoub, in 2 weeks.  Case d/w Dr. Coleman.        Electronically signed by RADHA Dallas, 02/09/25, 9:25 AM EST.

## 2025-02-09 NOTE — PLAN OF CARE
Problem: Adult Inpatient Plan of Care  Goal: Plan of Care Review  2/9/2025 1803 by Ayana Delaney RN  Outcome: Met  Flowsheets (Taken 2/9/2025 1803)  Progress: improving  Plan of Care Reviewed With:   patient   spouse  2/9/2025 1229 by Ayana Delaney RN  Outcome: Progressing  Flowsheets (Taken 2/9/2025 1229)  Progress: no change  Plan of Care Reviewed With: patient  Goal: Patient-Specific Goal (Individualized)  2/9/2025 1803 by Ayana Delaney RN  Outcome: Met  2/9/2025 1229 by Ayana Delaney RN  Outcome: Progressing  Goal: Absence of Hospital-Acquired Illness or Injury  2/9/2025 1803 by Ayana Delaney RN  Outcome: Met  2/9/2025 1229 by Ayana Delaney RN  Outcome: Progressing  Intervention: Identify and Manage Fall Risk  Recent Flowsheet Documentation  Taken 2/9/2025 1600 by Ayana Delaney RN  Safety Promotion/Fall Prevention:   assistive device/personal items within reach   clutter free environment maintained   fall prevention program maintained   nonskid shoes/slippers when out of bed   safety round/check completed   room organization consistent  Taken 2/9/2025 1400 by Ayana Delaney RN  Safety Promotion/Fall Prevention:   assistive device/personal items within reach   clutter free environment maintained   fall prevention program maintained   nonskid shoes/slippers when out of bed   safety round/check completed   room organization consistent  Taken 2/9/2025 1122 by Ayana Delaney RN  Safety Promotion/Fall Prevention:   assistive device/personal items within reach   clutter free environment maintained   fall prevention program maintained   nonskid shoes/slippers when out of bed   safety round/check completed   room organization consistent  Taken 2/9/2025 1000 by Ayana Delaney RN  Safety Promotion/Fall Prevention:   assistive device/personal items within reach   clutter free environment maintained   fall prevention program maintained   nonskid shoes/slippers when out of bed   safety round/check completed   room  organization consistent  Taken 2/9/2025 0740 by Ayana Delaney RN  Safety Promotion/Fall Prevention:   assistive device/personal items within reach   clutter free environment maintained   fall prevention program maintained   nonskid shoes/slippers when out of bed   safety round/check completed   room organization consistent  Intervention: Prevent Skin Injury  Recent Flowsheet Documentation  Taken 2/9/2025 1122 by Ayana Delaney RN  Body Position: position changed independently  Taken 2/9/2025 0740 by Ayana Delaney RN  Body Position: position changed independently  Intervention: Prevent and Manage VTE (Venous Thromboembolism) Risk  Recent Flowsheet Documentation  Taken 2/9/2025 1600 by Ayana Delaney RN  VTE Prevention/Management:   bilateral   SCDs (sequential compression devices) off   patient refused intervention  Taken 2/9/2025 1122 by Ayana Delaney RN  VTE Prevention/Management:   bilateral   SCDs (sequential compression devices) off   patient refused intervention  Taken 2/9/2025 0740 by Ayana Delaney RN  VTE Prevention/Management:   bilateral   SCDs (sequential compression devices) off   patient refused intervention  Intervention: Prevent Infection  Recent Flowsheet Documentation  Taken 2/9/2025 1600 by Ayana Delaney RN  Infection Prevention:   environmental surveillance performed   hand hygiene promoted   rest/sleep promoted   single patient room provided  Taken 2/9/2025 1400 by Ayana Delaney RN  Infection Prevention:   environmental surveillance performed   hand hygiene promoted   rest/sleep promoted   single patient room provided  Taken 2/9/2025 1122 by Ayana Delaney RN  Infection Prevention:   environmental surveillance performed   hand hygiene promoted   rest/sleep promoted   single patient room provided  Taken 2/9/2025 1000 by Ayana Delaney RN  Infection Prevention:   environmental surveillance performed   hand hygiene promoted   rest/sleep promoted   single patient room provided  Taken 2/9/2025  0740 by Ayana Delaney RN  Infection Prevention:   environmental surveillance performed   hand hygiene promoted   rest/sleep promoted   single patient room provided  Goal: Optimal Comfort and Wellbeing  2/9/2025 1803 by Ayana Delaney RN  Outcome: Met  2/9/2025 1229 by Ayana Delaney RN  Outcome: Progressing  Intervention: Provide Person-Centered Care  Recent Flowsheet Documentation  Taken 2/9/2025 1600 by Ayana Delaney RN  Trust Relationship/Rapport:   care explained   thoughts/feelings acknowledged  Taken 2/9/2025 1122 by Ayana Delaney RN  Trust Relationship/Rapport:   care explained   thoughts/feelings acknowledged  Taken 2/9/2025 0740 by Ayana Delaney RN  Trust Relationship/Rapport:   care explained   thoughts/feelings acknowledged  Goal: Readiness for Transition of Care  2/9/2025 1803 by Ayana Delaney RN  Outcome: Met  2/9/2025 1229 by Ayana Delaney RN  Outcome: Progressing     Problem: Comorbidity Management  Goal: Blood Pressure in Desired Range  2/9/2025 1803 by Ayana Delaney RN  Outcome: Met  2/9/2025 1229 by Ayana Delaney RN  Outcome: Progressing  Intervention: Maintain Blood Pressure Management  Recent Flowsheet Documentation  Taken 2/9/2025 1600 by Ayana Delaney RN  Medication Review/Management: medications reviewed  Taken 2/9/2025 1400 by Ayana Delaney RN  Medication Review/Management: medications reviewed  Taken 2/9/2025 1122 by Ayana Delaney RN  Medication Review/Management: medications reviewed  Taken 2/9/2025 1000 by Ayana Delaney RN  Medication Review/Management: medications reviewed  Taken 2/9/2025 0740 by Ayana Delaney RN  Medication Review/Management: medications reviewed   Goal Outcome Evaluation:  Plan of Care Reviewed With: patient, spouse        Progress: improving

## 2025-02-09 NOTE — PLAN OF CARE
Problem: Adult Inpatient Plan of Care  Goal: Plan of Care Review  Outcome: Progressing  Flowsheets (Taken 2/9/2025 1229)  Progress: no change  Plan of Care Reviewed With: patient  Goal: Patient-Specific Goal (Individualized)  Outcome: Progressing  Goal: Absence of Hospital-Acquired Illness or Injury  Outcome: Progressing  Intervention: Identify and Manage Fall Risk  Recent Flowsheet Documentation  Taken 2/9/2025 1122 by Ayana Delaney RN  Safety Promotion/Fall Prevention:   assistive device/personal items within reach   clutter free environment maintained   fall prevention program maintained   nonskid shoes/slippers when out of bed   safety round/check completed   room organization consistent  Taken 2/9/2025 1000 by Ayana Delaney RN  Safety Promotion/Fall Prevention:   assistive device/personal items within reach   clutter free environment maintained   fall prevention program maintained   nonskid shoes/slippers when out of bed   safety round/check completed   room organization consistent  Taken 2/9/2025 0740 by Aynaa Delaney RN  Safety Promotion/Fall Prevention:   assistive device/personal items within reach   clutter free environment maintained   fall prevention program maintained   nonskid shoes/slippers when out of bed   safety round/check completed   room organization consistent  Intervention: Prevent Skin Injury  Recent Flowsheet Documentation  Taken 2/9/2025 1122 by Ayana Delaney RN  Body Position: position changed independently  Taken 2/9/2025 0740 by Ayana Delaney RN  Body Position: position changed independently  Intervention: Prevent and Manage VTE (Venous Thromboembolism) Risk  Recent Flowsheet Documentation  Taken 2/9/2025 1122 by Ayana Delaney RN  VTE Prevention/Management:   bilateral   SCDs (sequential compression devices) off   patient refused intervention  Taken 2/9/2025 0740 by Ayana Delaney RN  VTE Prevention/Management:   bilateral   SCDs (sequential compression devices) off   patient  refused intervention  Intervention: Prevent Infection  Recent Flowsheet Documentation  Taken 2/9/2025 1122 by Ayana Delaney RN  Infection Prevention:   environmental surveillance performed   hand hygiene promoted   rest/sleep promoted   single patient room provided  Taken 2/9/2025 1000 by Ayana Delaney RN  Infection Prevention:   environmental surveillance performed   hand hygiene promoted   rest/sleep promoted   single patient room provided  Taken 2/9/2025 0740 by Ayana Delaney RN  Infection Prevention:   environmental surveillance performed   hand hygiene promoted   rest/sleep promoted   single patient room provided  Goal: Optimal Comfort and Wellbeing  Outcome: Progressing  Intervention: Provide Person-Centered Care  Recent Flowsheet Documentation  Taken 2/9/2025 1122 by Ayana Delaney RN  Trust Relationship/Rapport:   care explained   thoughts/feelings acknowledged  Taken 2/9/2025 0740 by Ayana Delaney RN  Trust Relationship/Rapport:   care explained   thoughts/feelings acknowledged  Goal: Readiness for Transition of Care  Outcome: Progressing     Problem: Comorbidity Management  Goal: Blood Pressure in Desired Range  Outcome: Progressing  Intervention: Maintain Blood Pressure Management  Recent Flowsheet Documentation  Taken 2/9/2025 1122 by Ayana Delaney RN  Medication Review/Management: medications reviewed  Taken 2/9/2025 1000 by Ayana Delaney RN  Medication Review/Management: medications reviewed  Taken 2/9/2025 0740 by Ayana Delaney RN  Medication Review/Management: medications reviewed   Goal Outcome Evaluation:  Plan of Care Reviewed With: patient        Progress: no change

## 2025-02-10 NOTE — CASE MANAGEMENT/SOCIAL WORK
Case Management Discharge Note      Final Note: Routine home.         Selected Continued Care - Discharged on 2/9/2025 Admission date: 2/7/2025 - Discharge disposition: Home or Self Care          Transportation Services  Private: Car    Final Discharge Disposition Code: 01 - home or self-care

## 2025-02-13 LAB — SEROTONIN SER-MCNC: 108 NG/ML (ref 31–207)

## 2025-02-14 LAB
QT INTERVAL: 436 MS
QTC INTERVAL: 411 MS

## 2025-02-15 NOTE — DISCHARGE SUMMARY
Date of Discharge:  2/9/2025    Discharge Diagnosis:     Chest pain with SOB        Dysphagia, N/V with wt loss      Pneumonia      Generalized anxiety disorder      Major recurrent depression       Elevated TSH      Tobacco abuse         Presenting Problem/History of Present Illness  Active Hospital Problems   No active problems to display.      Resolved Hospital Problems    Diagnosis Date Resolved POA    **Chest pain [R07.9] 02/09/2025 Yes          Hospital Course  Patient is a 34 y.o. female presented with CP, SOB, epigastric pain, dysphagia, cardiology and GI consulted, pt had 2D ECHO and nuclear stress test , they were negative, her CP resolved, GI wanted to do the testing as out patient, and all consultants have cleared her to be discharged , Elevated TSH will be addressed as out patient      Procedures Performed         Consults:   Consults       Date and Time Order Name Status Description    2/7/2025  4:21 PM Inpatient Cardiology Consult Completed             Pertinent Test Results:  Lab Results (last 24 hours)       Procedure Component Value Units Date/Time    Comprehensive Metabolic Panel [[id number]] Collected: 02/09/25 0505    Specimen: Blood from Arm, Right Updated: 02/09/25 0601     Glucose 94 mg/dL      BUN 12 mg/dL      Creatinine 0.94 mg/dL      Sodium 139 mmol/L      Potassium 3.9 mmol/L      Chloride 105 mmol/L      CO2 24.9 mmol/L      Calcium 9.3 mg/dL      Total Protein 6.8 g/dL      Albumin 4.0 g/dL      ALT (SGPT) 19 U/L      AST (SGOT) 18 U/L      Alkaline Phosphatase 66 U/L      Total Bilirubin 0.7 mg/dL      Globulin 2.8 gm/dL      A/G Ratio 1.4 g/dL      BUN/Creatinine Ratio 12.8     Anion Gap 9.1 mmol/L      eGFR 81.3 mL/min/1.73     Narrative:      GFR Categories in Chronic Kidney Disease (CKD)      GFR Category          GFR (mL/min/1.73)    Interpretation  G1                     90 or greater         Normal or high (1)  G2                      60-89                Mild decrease  (1)  G3a                   45-59                Mild to moderate decrease  G3b                   30-44                Moderate to severe decrease  G4                    15-29                Severe decrease  G5                    14 or less           Kidney failure          (1)In the absence of evidence of kidney disease, neither GFR category G1 or G2 fulfill the criteria for CKD.    eGFR calculation 2021 CKD-EPI creatinine equation, which does not include race as a factor    TSH [[id number]]  (Abnormal) Collected: 02/09/25 0505    Specimen: Blood from Arm, Right Updated: 02/09/25 0601     TSH 9.270 uIU/mL     CBC & Differential [[id number]]  (Abnormal) Collected: 02/09/25 0505    Specimen: Blood from Arm, Right Updated: 02/09/25 0520    Narrative:      The following orders were created for panel order CBC & Differential.  Procedure                               Abnormality         Status                     ---------                               -----------         ------                     CBC Auto Differential[[id number]]        Abnormal            Final result                 Please view results for these tests on the individual orders.    CBC Auto Differential [[id number]]  (Abnormal) Collected: 02/09/25 0505    Specimen: Blood from Arm, Right Updated: 02/09/25 0520     WBC 6.93 10*3/mm3      RBC 4.20 10*6/mm3      Hemoglobin 12.1 g/dL      Hematocrit 38.8 %      MCV 92.4 fL      MCH 28.8 pg      MCHC 31.2 g/dL      RDW 12.5 %      RDW-SD 41.9 fl      MPV 9.6 fL      Platelets 306 10*3/mm3      Neutrophil % 41.7 %      Lymphocyte % 48.6 %      Monocyte % 6.6 %      Eosinophil % 2.3 %      Basophil % 0.7 %      Immature Grans % 0.1 %      Neutrophils, Absolute 2.88 10*3/mm3      Lymphocytes, Absolute 3.37 10*3/mm3      Monocytes, Absolute 0.46 10*3/mm3      Eosinophils, Absolute 0.16 10*3/mm3      Basophils, Absolute 0.05 10*3/mm3      Immature Grans, Absolute 0.01 10*3/mm3      nRBC 0.0 /100 WBC            I have  reviewed lab results.    Condition on Discharge:  Resolved     Vital Signs       Physical Exam:     General Appearance:    Alert, cooperative, in no acute distress   Ears:    Ears appear intact with no abnormalities noted   Throat:   No oral lesions, no thrush, oral mucosa moist   Neck:   No adenopathy, supple, trachea midline, no thyromegaly, no   carotid bruit, no JVD   Lungs:     Clear to auscultation,respirations regular, even and                Unlabored     Heart:    Regular rhythm and normal rate, normal S1 and S2, no          murmur, no gallop, no rub, no click   Abdomen:     Normal bowel sounds, no masses, no organomegaly, soft      non-tender, non-distended, no guarding, no rebound                tenderness   Extremities:   Moves all extremities well, no edema, no cyanosis, no           redness   Skin:   No bleeding, bruising or rash   Neurologic:   Cranial nerves 2 - 12 grossly intact, sensation intact, DTR       present and equal bilaterally       Discharge Disposition  Home or Self Care    Discharge Medications     Discharge Medications        New Medications        Instructions Start Date   hyoscyamine 0.125 MG SL tablet  Commonly known as: LEVSIN   250 mcg, Sublingual, 3 Times Daily With Meals             Continue These Medications        Instructions Start Date   ALPRAZolam 0.5 MG tablet  Commonly known as: XANAX   0.5 mg, Daily      famotidine 40 MG tablet  Commonly known as: PEPCID   1 tablet, Daily      metoprolol succinate XL 25 MG 24 hr tablet  Commonly known as: TOPROL-XL   1 tablet, Daily      montelukast 10 MG tablet  Commonly known as: SINGULAIR   10 mg, Nightly      omeprazole 40 MG capsule  Commonly known as: priLOSEC   40 mg, Daily      sucralfate 1 g tablet  Commonly known as: CARAFATE   1 tablet, Every 12 Hours Scheduled             Stop These Medications      aspirin 81 MG chewable tablet     diclofenac sodium 100 MG 24 hr tablet  Commonly known as: VOTAREN XR     nitroglycerin 0.4 MG  SL tablet  Commonly known as: NITROSTAT     pantoprazole 40 MG EC tablet  Commonly known as: PROTONIX            ASK your doctor about these medications        Instructions Start Date   azithromycin 500 MG tablet  Commonly known as: ZITHROMAX  Ask about: Should I take this medication?   500 mg, Oral, Every 24 Hours Scheduled               Discharge Diet:     Activity at Discharge:     Follow-up Appointments  No future appointments.  Additional Instructions for the Follow-ups that You Need to Schedule       Call MD With Problems / Concerns   As directed      Instructions: Called [phone] if fever greater then 101, nausea/vomiting, chest pain, shortness of breath or abdominal pain    Order Comments: Instructions: Called [phone] if fever greater then 101, nausea/vomiting, chest pain, shortness of breath or abdominal pain         Discharge Follow-up with PCP   As directed       Currently Documented PCP:    Tati Issa MD    PCP Phone Number:    [phone]     Follow Up Details: Hospital Follow up appointment with Dr. Issa on .   Office number [phone]                Test Results Pending at Discharge  Pending Results       None             Tati Issa MD  02/15/25  18:14 EST

## 2025-06-10 ENCOUNTER — TRANSCRIBE ORDERS (OUTPATIENT)
Dept: ADMINISTRATIVE | Facility: HOSPITAL | Age: 35
End: 2025-06-10
Payer: COMMERCIAL

## 2025-06-10 DIAGNOSIS — R10.13 EPIGASTRIC ABDOMINAL PAIN: Primary | ICD-10-CM

## 2025-06-10 DIAGNOSIS — R11.2 NAUSEA AND VOMITING, UNSPECIFIED VOMITING TYPE: ICD-10-CM

## 2025-06-30 ENCOUNTER — HOSPITAL ENCOUNTER (OUTPATIENT)
Dept: NUCLEAR MEDICINE | Facility: HOSPITAL | Age: 35
Discharge: HOME | End: 2025-06-30
Admitting: FAMILY MEDICINE
Payer: COMMERCIAL

## 2025-06-30 DIAGNOSIS — R11.2 NAUSEA AND VOMITING, UNSPECIFIED VOMITING TYPE: ICD-10-CM

## 2025-06-30 DIAGNOSIS — R10.13 EPIGASTRIC ABDOMINAL PAIN: ICD-10-CM

## 2025-06-30 PROCEDURE — A9541 TC99M SULFUR COLLOID: HCPCS | Performed by: FAMILY MEDICINE

## 2025-06-30 PROCEDURE — 34310000005 TECHNETIUM SULFUR COLLOID: Performed by: FAMILY MEDICINE

## 2025-06-30 PROCEDURE — 78264 GASTRIC EMPTYING IMG STUDY: CPT

## 2025-06-30 RX ORDER — KIT FOR THE PREPARATION OF TECHNETIUM TC 99M MEBROFENIN 45 MG/10ML
1 INJECTION, POWDER, LYOPHILIZED, FOR SOLUTION INTRAVENOUS
Status: DISCONTINUED | OUTPATIENT
Start: 2025-06-30 | End: 2025-07-01 | Stop reason: HOSPADM

## 2025-06-30 RX ADMIN — TECHNETIUM TC 99M SULFUR COLLOID 1 DOSE: KIT at 07:29

## 2025-08-05 ENCOUNTER — TRANSCRIBE ORDERS (OUTPATIENT)
Dept: ADMINISTRATIVE | Facility: HOSPITAL | Age: 35
End: 2025-08-05
Payer: COMMERCIAL

## 2025-08-05 DIAGNOSIS — R10.11 RUQ PAIN: Primary | ICD-10-CM

## 2025-08-11 ENCOUNTER — HOSPITAL ENCOUNTER (OUTPATIENT)
Dept: ULTRASOUND IMAGING | Facility: HOSPITAL | Age: 35
Discharge: HOME | End: 2025-08-11
Admitting: FAMILY MEDICINE
Payer: COMMERCIAL

## 2025-08-11 DIAGNOSIS — R10.11 RUQ PAIN: ICD-10-CM

## 2025-08-11 PROCEDURE — 76705 ECHO EXAM OF ABDOMEN: CPT
